# Patient Record
Sex: FEMALE | Race: WHITE | NOT HISPANIC OR LATINO | URBAN - METROPOLITAN AREA
[De-identification: names, ages, dates, MRNs, and addresses within clinical notes are randomized per-mention and may not be internally consistent; named-entity substitution may affect disease eponyms.]

---

## 2017-06-20 ENCOUNTER — EMERGENCY (EMERGENCY)
Facility: HOSPITAL | Age: 22
LOS: 0 days | Discharge: HOME | End: 2017-06-21
Attending: EMERGENCY MEDICINE | Admitting: EMERGENCY MEDICINE
Payer: COMMERCIAL

## 2017-06-20 VITALS
HEIGHT: 63 IN | RESPIRATION RATE: 18 BRPM | OXYGEN SATURATION: 100 % | HEART RATE: 100 BPM | TEMPERATURE: 99 F | SYSTOLIC BLOOD PRESSURE: 124 MMHG | DIASTOLIC BLOOD PRESSURE: 86 MMHG | WEIGHT: 179.9 LBS

## 2017-06-20 DIAGNOSIS — L55.1 SUNBURN OF SECOND DEGREE: ICD-10-CM

## 2017-06-20 PROCEDURE — 99283 EMERGENCY DEPT VISIT LOW MDM: CPT

## 2017-06-20 RX ORDER — ONDANSETRON 8 MG/1
4 TABLET, FILM COATED ORAL ONCE
Qty: 0 | Refills: 0 | Status: COMPLETED | OUTPATIENT
Start: 2017-06-20 | End: 2017-06-20
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
## 2017-06-21 VITALS
OXYGEN SATURATION: 100 % | DIASTOLIC BLOOD PRESSURE: 68 MMHG | RESPIRATION RATE: 18 BRPM | HEART RATE: 72 BPM | TEMPERATURE: 98 F | SYSTOLIC BLOOD PRESSURE: 133 MMHG

## 2017-06-21 RX ADMIN — ONDANSETRON 4 MILLIGRAM(S): 8 TABLET, FILM COATED ORAL at 00:32
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
## 2020-05-05 ENCOUNTER — APPOINTMENT (OUTPATIENT)
Dept: FAMILY MEDICINE | Facility: CLINIC | Age: 25
End: 2020-05-05
Payer: COMMERCIAL

## 2020-05-05 ENCOUNTER — LABORATORY RESULT (OUTPATIENT)
Age: 25
End: 2020-05-05

## 2020-05-05 VITALS
TEMPERATURE: 98 F | HEART RATE: 78 BPM | HEIGHT: 63 IN | OXYGEN SATURATION: 99 % | BODY MASS INDEX: 33.13 KG/M2 | SYSTOLIC BLOOD PRESSURE: 108 MMHG | WEIGHT: 187 LBS | DIASTOLIC BLOOD PRESSURE: 70 MMHG

## 2020-05-05 DIAGNOSIS — E55.9 VITAMIN D DEFICIENCY, UNSPECIFIED: ICD-10-CM

## 2020-05-05 DIAGNOSIS — Z83.2 FAMILY HISTORY OF DISEASES OF THE BLOOD AND BLOOD-FORMING ORGANS AND CERTAIN DISORDERS INVOLVING THE IMMUNE MECHANISM: ICD-10-CM

## 2020-05-05 DIAGNOSIS — Z84.0 FAMILY HISTORY OF DISEASES OF THE SKIN AND SUBCUTANEOUS TISSUE: ICD-10-CM

## 2020-05-05 DIAGNOSIS — E07.89 OTHER SPECIFIED DISORDERS OF THYROID: ICD-10-CM

## 2020-05-05 DIAGNOSIS — Z82.69 FAMILY HISTORY OF OTHER DISEASES OF THE MUSCULOSKELETAL SYSTEM AND CONNECTIVE TISSUE: ICD-10-CM

## 2020-05-05 DIAGNOSIS — Z80.3 FAMILY HISTORY OF MALIGNANT NEOPLASM OF BREAST: ICD-10-CM

## 2020-05-05 PROCEDURE — 99385 PREV VISIT NEW AGE 18-39: CPT | Mod: 25

## 2020-05-05 PROCEDURE — 36415 COLL VENOUS BLD VENIPUNCTURE: CPT

## 2020-05-06 NOTE — PHYSICAL EXAM
[No Acute Distress] : no acute distress [Well Nourished] : well nourished [Well Developed] : well developed [Well-Appearing] : well-appearing [Normal Sclera/Conjunctiva] : normal sclera/conjunctiva [PERRL] : pupils equal round and reactive to light [EOMI] : extraocular movements intact [Normal Outer Ear/Nose] : the outer ears and nose were normal in appearance [Normal Oropharynx] : the oropharynx was normal [No JVD] : no jugular venous distention [No Lymphadenopathy] : no lymphadenopathy [Supple] : supple [Thyroid Normal, No Nodules] : the thyroid was normal and there were no nodules present [No Respiratory Distress] : no respiratory distress  [No Accessory Muscle Use] : no accessory muscle use [Clear to Auscultation] : lungs were clear to auscultation bilaterally [Normal Rate] : normal rate  [Regular Rhythm] : with a regular rhythm [Normal S1, S2] : normal S1 and S2 [No Murmur] : no murmur heard [No Carotid Bruits] : no carotid bruits [No Abdominal Bruit] : a ~M bruit was not heard ~T in the abdomen [No Varicosities] : no varicosities [Pedal Pulses Present] : the pedal pulses are present [No Edema] : there was no peripheral edema [No Palpable Aorta] : no palpable aorta [No Extremity Clubbing/Cyanosis] : no extremity clubbing/cyanosis [Soft] : abdomen soft [Non Tender] : non-tender [Non-distended] : non-distended [No Masses] : no abdominal mass palpated [No HSM] : no HSM [Normal Bowel Sounds] : normal bowel sounds [Normal Posterior Cervical Nodes] : no posterior cervical lymphadenopathy [Normal Anterior Cervical Nodes] : no anterior cervical lymphadenopathy [No CVA Tenderness] : no CVA  tenderness [No Spinal Tenderness] : no spinal tenderness [No Joint Swelling] : no joint swelling [Grossly Normal Strength/Tone] : grossly normal strength/tone [No Rash] : no rash [Coordination Grossly Intact] : coordination grossly intact [No Focal Deficits] : no focal deficits [Normal Gait] : normal gait [Deep Tendon Reflexes (DTR)] : deep tendon reflexes were 2+ and symmetric [Normal Affect] : the affect was normal [Normal Insight/Judgement] : insight and judgment were intact [de-identified] : palpable thyroid [de-identified] : tender over left epicondyle

## 2020-05-06 NOTE — HISTORY OF PRESENT ILLNESS
[FreeTextEntry1] : NP, CPE, pt c/o left wrist pain on and off x 6 months  [de-identified] : 24 year old female to establish care\par last pap about 4 years ago\par menarche age 12\par history of irregular periods\par was regular for a time and now becoming irregular again only coming every three months\par \par also complains of left wrist wain, started from elbow down\par \par works as a  and a \par \par engaged to be \par \par walks for exercise\par gained about 15 pounds in the past year\par regular diet, restricts soda\par eats a lot of chocolate

## 2020-05-06 NOTE — HEALTH RISK ASSESSMENT
[Patient reported PAP Smear was normal] : Patient reported PAP Smear was normal [PapSmearDate] : 01/16 [PapSmearComments] : n

## 2020-05-15 ENCOUNTER — TRANSCRIPTION ENCOUNTER (OUTPATIENT)
Age: 25
End: 2020-05-15

## 2020-07-02 ENCOUNTER — APPOINTMENT (OUTPATIENT)
Dept: OBGYN | Facility: CLINIC | Age: 25
End: 2020-07-02
Payer: COMMERCIAL

## 2020-07-02 ENCOUNTER — LABORATORY RESULT (OUTPATIENT)
Age: 25
End: 2020-07-02

## 2020-07-02 VITALS
WEIGHT: 184 LBS | BODY MASS INDEX: 32.6 KG/M2 | DIASTOLIC BLOOD PRESSURE: 80 MMHG | SYSTOLIC BLOOD PRESSURE: 108 MMHG | HEIGHT: 63 IN

## 2020-07-02 DIAGNOSIS — Z13.1 ENCOUNTER FOR SCREENING FOR DIABETES MELLITUS: ICD-10-CM

## 2020-07-02 DIAGNOSIS — Z78.9 OTHER SPECIFIED HEALTH STATUS: ICD-10-CM

## 2020-07-02 DIAGNOSIS — R10.31 RIGHT LOWER QUADRANT PAIN: ICD-10-CM

## 2020-07-02 DIAGNOSIS — Z13.220 ENCOUNTER FOR SCREENING FOR LIPOID DISORDERS: ICD-10-CM

## 2020-07-02 DIAGNOSIS — Z13.89 ENCOUNTER FOR SCREENING FOR OTHER DISORDER: ICD-10-CM

## 2020-07-02 DIAGNOSIS — L68.0 HIRSUTISM: ICD-10-CM

## 2020-07-02 DIAGNOSIS — Z11.3 ENCOUNTER FOR SCREENING FOR INFECTIONS WITH A PREDOMINANTLY SEXUAL MODE OF TRANSMISSION: ICD-10-CM

## 2020-07-02 DIAGNOSIS — Z13.29 ENCOUNTER FOR SCREENING FOR OTHER SUSPECTED ENDOCRINE DISORDER: ICD-10-CM

## 2020-07-02 PROCEDURE — 36415 COLL VENOUS BLD VENIPUNCTURE: CPT

## 2020-07-02 PROCEDURE — 99214 OFFICE O/P EST MOD 30 MIN: CPT | Mod: 25

## 2020-07-02 PROCEDURE — 99385 PREV VISIT NEW AGE 18-39: CPT

## 2020-07-02 NOTE — HISTORY OF PRESENT ILLNESS
[___ Year(s) Ago] : [unfilled] year(s) ago [Healthy Diet] : a healthy diet [Good] : being in good health [Regular Exercise] : regular exercise [Last Pap ___] : Last cervical pap smear was [unfilled] [Reproductive Age] : is of reproductive age [Menstrual Problems] : reports abnormal menses [Menarche Age ____] : age at menarche was [unfilled] [Definite ___ (Date)] : the last menstrual period was [unfilled] [Irregular Cycle Intervals] : are  irregular [Regular Duration] : has been regular [Sexually Active] : is sexually active [Definite:  ___ (Date)] : the last menstrual period was [unfilled] [Contraception] : uses contraception [Menarche Age: ____] : age at menarche was [unfilled] [Condoms] : uses condoms [Yes] : yes [Burning] : no burning [Pregnancy History] : denies prior pregnancies [Stinging] : no stinging [Itching] : no itching [Mass] : no mass [Lesion] : no lesion [Soreness] : no soreness [Discharge] : no discharge [Localized Pain] : no localized pain [Mass (___cm)] : no palpable mass [Diffused Pain] : no diffused pain [Nipple Discharge] : no nipple discharge [Hot Flashes] : no hot flashes [Skin Color Change] : no skin color change [Night Sweats] : no night sweats

## 2020-07-02 NOTE — REVIEW OF SYSTEMS
[Dizziness] : dizziness [Headache] : headache [Nl] : Integumentary [Fever] : no fever [Chills] : no chills [Heart Rate Is Fast] : the heart rate was not fast [Chest Pain] : no chest pain [Dyspnea] : no shortness of breath [Palpitations] : no palpitations [Cough] : no cough [SOB on Exertion] : no shortness of breath during exertion [Abdominal Pain] : no abdominal pain [Vomiting] : no vomiting [Pelvic Pain] : pelvic pain [Dysuria] : no dysuria [Abn Vag Bleeding] : abnormal vaginal bleeding [Frequency] : no frequency

## 2020-07-02 NOTE — COUNSELING
[Breast Self Exam] : breast self exam [HIV Pretest] : HIV pretest [STD (testing, results, tx)] : STD (testing, results, tx) [Contraception] : contraception [Safe Sexual Practices] : safe sexual practices

## 2020-07-06 LAB
BASOPHILS # BLD AUTO: 0.04 K/UL
BASOPHILS NFR BLD AUTO: 0.5 %
C TRACH RRNA SPEC QL NAA+PROBE: NOT DETECTED
DHEA-S SERPL-MCNC: 384 UG/DL
EOSINOPHIL # BLD AUTO: 0.08 K/UL
EOSINOPHIL NFR BLD AUTO: 1.1 %
ESTRADIOL SERPL-MCNC: 49 PG/ML
FSH SERPL-MCNC: 6.2 IU/L
HAV IGM SER QL: NONREACTIVE
HBV CORE IGM SER QL: NONREACTIVE
HBV SURFACE AG SER QL: NONREACTIVE
HCG SERPL-MCNC: <1 MIU/ML
HCT VFR BLD CALC: 48 %
HCV AB SER QL: NONREACTIVE
HCV S/CO RATIO: 0.14 S/CO
HGB BLD-MCNC: 14.6 G/DL
HIV1+2 AB SPEC QL IA.RAPID: NONREACTIVE
HPV HIGH+LOW RISK DNA PNL CVX: NOT DETECTED
IMM GRANULOCYTES NFR BLD AUTO: 0.1 %
LH SERPL-ACNC: 10.5 IU/L
LYMPHOCYTES # BLD AUTO: 2.16 K/UL
LYMPHOCYTES NFR BLD AUTO: 29.5 %
MAN DIFF?: NORMAL
MCHC RBC-ENTMCNC: 30.4 GM/DL
MCHC RBC-ENTMCNC: 30.7 PG
MCV RBC AUTO: 101.1 FL
MONOCYTES # BLD AUTO: 0.67 K/UL
MONOCYTES NFR BLD AUTO: 9.2 %
N GONORRHOEA RRNA SPEC QL NAA+PROBE: NOT DETECTED
NEUTROPHILS # BLD AUTO: 4.36 K/UL
NEUTROPHILS NFR BLD AUTO: 59.6 %
PLATELET # BLD AUTO: 315 K/UL
PROLACTIN SERPL-MCNC: 16.5 NG/ML
RBC # BLD: 4.75 M/UL
RBC # FLD: 12.8 %
SOURCE TP AMPLIFICATION: NORMAL
T PALLIDUM AB SER QL IA: NEGATIVE
T3RU NFR SERPL: 1 TBI
TSH SERPL-ACNC: 1.42 UIU/ML
WBC # FLD AUTO: 7.32 K/UL

## 2020-07-08 LAB — CYTOLOGY CVX/VAG DOC THIN PREP: NORMAL

## 2020-07-13 ENCOUNTER — ASOB RESULT (OUTPATIENT)
Age: 25
End: 2020-07-13

## 2020-07-13 ENCOUNTER — APPOINTMENT (OUTPATIENT)
Dept: OBGYN | Facility: CLINIC | Age: 25
End: 2020-07-13
Payer: COMMERCIAL

## 2020-07-13 PROCEDURE — 76830 TRANSVAGINAL US NON-OB: CPT

## 2020-07-19 LAB
DHEA-SULFATE, SERUM: 323 UG/DL
T3REVERSE SERPL-MCNC: 17.8 NG/DL
TESTOST BND SERPL-MCNC: 8.7 PG/ML
TESTOST SERPL-MCNC: 59.9 NG/DL

## 2020-10-30 DIAGNOSIS — L70.9 ACNE, UNSPECIFIED: ICD-10-CM

## 2020-10-30 LAB
25(OH)D3 SERPL-MCNC: 18.5 NG/ML
ALBUMIN SERPL ELPH-MCNC: 5.2 G/DL
ALP BLD-CCNC: 67 U/L
ALT SERPL-CCNC: 15 U/L
ANION GAP SERPL CALC-SCNC: 13 MMOL/L
APPEARANCE: CLEAR
AST SERPL-CCNC: 18 U/L
BACTERIA: ABNORMAL
BASOPHILS # BLD AUTO: 0.03 K/UL
BASOPHILS NFR BLD AUTO: 0.5 %
BILIRUB SERPL-MCNC: 0.4 MG/DL
BILIRUBIN URINE: NEGATIVE
BLOOD URINE: NEGATIVE
BUN SERPL-MCNC: 12 MG/DL
CALCIUM SERPL-MCNC: 10 MG/DL
CHLORIDE SERPL-SCNC: 101 MMOL/L
CHOLEST SERPL-MCNC: 181 MG/DL
CHOLEST/HDLC SERPL: 3.8 RATIO
CO2 SERPL-SCNC: 23 MMOL/L
COLOR: YELLOW
CREAT SERPL-MCNC: 0.61 MG/DL
EOSINOPHIL # BLD AUTO: 0.1 K/UL
EOSINOPHIL NFR BLD AUTO: 1.7 %
ESTIMATED AVERAGE GLUCOSE: 108 MG/DL
ESTRADIOL SERPL-MCNC: 33 PG/ML
ESTROGEN SERPL-MCNC: 179 PG/ML
FSH SERPL-MCNC: 5.8 IU/L
GLUCOSE QUALITATIVE U: NEGATIVE
GLUCOSE SERPL-MCNC: 96 MG/DL
HBA1C MFR BLD HPLC: 5.4 %
HCT VFR BLD CALC: 41.7 %
HDLC SERPL-MCNC: 48 MG/DL
HGB BLD-MCNC: 13.7 G/DL
HIV1+2 AB SPEC QL IA.RAPID: NONREACTIVE
HYALINE CASTS: 0 /LPF
IMM GRANULOCYTES NFR BLD AUTO: 0.2 %
INSULIN SERPL-MCNC: 16.8 UU/ML
KETONES URINE: NEGATIVE
LDLC SERPL CALC-MCNC: 117 MG/DL
LEUKOCYTE ESTERASE URINE: NEGATIVE
LH SERPL-ACNC: 14 IU/L
LYMPHOCYTES # BLD AUTO: 1.92 K/UL
LYMPHOCYTES NFR BLD AUTO: 32.4 %
MAN DIFF?: NORMAL
MCHC RBC-ENTMCNC: 31.2 PG
MCHC RBC-ENTMCNC: 32.9 GM/DL
MCV RBC AUTO: 95 FL
MICROSCOPIC-UA: NORMAL
MONOCYTES # BLD AUTO: 0.55 K/UL
MONOCYTES NFR BLD AUTO: 9.3 %
NEUTROPHILS # BLD AUTO: 3.31 K/UL
NEUTROPHILS NFR BLD AUTO: 55.9 %
NITRITE URINE: NEGATIVE
PH URINE: 6.5
PLATELET # BLD AUTO: 252 K/UL
POTASSIUM SERPL-SCNC: 4.6 MMOL/L
PROGEST SERPL-MCNC: 0.3 NG/ML
PROLACTIN SERPL-MCNC: 18.4 NG/ML
PROT SERPL-MCNC: 8 G/DL
PROTEIN URINE: NORMAL
RBC # BLD: 4.39 M/UL
RBC # FLD: 12.5 %
RED BLOOD CELLS URINE: 1 /HPF
SODIUM SERPL-SCNC: 137 MMOL/L
SPECIFIC GRAVITY URINE: 1.03
SQUAMOUS EPITHELIAL CELLS: 8 /HPF
T3 SERPL-MCNC: 109 NG/DL
T3FREE SERPL-MCNC: 3.3 PG/ML
T3RU NFR SERPL: 1 TBI
T4 FREE SERPL-MCNC: 1.1 NG/DL
T4 SERPL-MCNC: 6.2 UG/DL
TESTOST SERPL-MCNC: 45.6 NG/DL
THYROGLOB AB SERPL-ACNC: 20.6 IU/ML
THYROPEROXIDASE AB SERPL IA-ACNC: <10 IU/ML
TRIGL SERPL-MCNC: 78 MG/DL
TSH SERPL-ACNC: 2.27 UIU/ML
URATE SERPL-MCNC: 6.9 MG/DL
UROBILINOGEN URINE: NORMAL
WBC # FLD AUTO: 5.92 K/UL
WHITE BLOOD CELLS URINE: 2 /HPF

## 2021-02-26 ENCOUNTER — NON-APPOINTMENT (OUTPATIENT)
Age: 26
End: 2021-02-26

## 2021-07-06 ENCOUNTER — APPOINTMENT (OUTPATIENT)
Dept: OBGYN | Facility: CLINIC | Age: 26
End: 2021-07-06
Payer: COMMERCIAL

## 2021-07-06 VITALS
BODY MASS INDEX: 34.2 KG/M2 | SYSTOLIC BLOOD PRESSURE: 124 MMHG | WEIGHT: 193 LBS | DIASTOLIC BLOOD PRESSURE: 72 MMHG | HEIGHT: 63 IN | TEMPERATURE: 97.5 F

## 2021-07-06 DIAGNOSIS — E66.9 OBESITY, UNSPECIFIED: ICD-10-CM

## 2021-07-06 DIAGNOSIS — Z11.51 ENCOUNTER FOR SCREENING FOR HUMAN PAPILLOMAVIRUS (HPV): ICD-10-CM

## 2021-07-06 DIAGNOSIS — N92.1 EXCESSIVE AND FREQUENT MENSTRUATION WITH IRREGULAR CYCLE: ICD-10-CM

## 2021-07-06 DIAGNOSIS — Z31.69 ENCOUNTER FOR OTHER GENERAL COUNSELING AND ADVICE ON PROCREATION: ICD-10-CM

## 2021-07-06 DIAGNOSIS — Z87.42 PERSONAL HISTORY OF OTHER DISEASES OF THE FEMALE GENITAL TRACT: ICD-10-CM

## 2021-07-06 DIAGNOSIS — Z12.4 ENCOUNTER FOR SCREENING FOR MALIGNANT NEOPLASM OF CERVIX: ICD-10-CM

## 2021-07-06 DIAGNOSIS — Z01.411 ENCOUNTER FOR GYNECOLOGICAL EXAMINATION (GENERAL) (ROUTINE) WITH ABNORMAL FINDINGS: ICD-10-CM

## 2021-07-06 DIAGNOSIS — Z30.41 ENCOUNTER FOR SURVEILLANCE OF CONTRACEPTIVE PILLS: ICD-10-CM

## 2021-07-06 PROCEDURE — 99395 PREV VISIT EST AGE 18-39: CPT

## 2021-07-06 PROCEDURE — 99072 ADDL SUPL MATRL&STAF TM PHE: CPT

## 2021-07-06 NOTE — COUNSELING
[Nutrition/ Exercise/ Weight Management] : nutrition, exercise, weight management [Vitamins/Supplements] : vitamins/supplements [Breast Self Exam] : breast self exam [Contraception/ Emergency Contraception/ Safe Sexual Practices] : contraception, emergency contraception, safe sexual practices [Sunscreen] : sunscreen

## 2021-07-06 NOTE — HISTORY OF PRESENT ILLNESS
[Oral Contraceptive] : uses oral contraception pills [Y] : Patient is sexually active [N] : Patient denies prior pregnancies [Menarche Age: ____] : age at menarche was [unfilled] [Currently Active] : currently active [Men] : men [PapSmeardate] : 07/02/2020 [TextBox_31] : neg [LMPDate] : 06/27/2021 [FreeTextEntry1] : 06/27/2021

## 2021-07-06 NOTE — DISCUSSION/SUMMARY
[FreeTextEntry1] : The R/B/C of OBC's were reviewed.  Pt encouraged to initiate a diet and exercise regimen to decrease weight, health risk reviewed.  Reviewed low Vit D level, need for supplements.  Pt stating she will initiate VIt D3, 2000 IU daily. PT will also start multivitamin/folic acid  - in preparation for starting family, next year.  Pt to RTO annual yearly. Discussed when ready to try conceiving, to stop OBC's 3 months prior, ovulation reviewed, pt starting multivitamin/folic acid.   Pt    All the pt's questions and concerns were addressed.

## 2021-07-24 LAB — CYTOLOGY CVX/VAG DOC THIN PREP: NORMAL

## 2021-08-03 ENCOUNTER — APPOINTMENT (OUTPATIENT)
Dept: ORTHOPEDIC SURGERY | Facility: CLINIC | Age: 26
End: 2021-08-03
Payer: COMMERCIAL

## 2021-08-03 ENCOUNTER — NON-APPOINTMENT (OUTPATIENT)
Age: 26
End: 2021-08-03

## 2021-08-03 VITALS
HEART RATE: 67 BPM | WEIGHT: 193 LBS | BODY MASS INDEX: 34.2 KG/M2 | DIASTOLIC BLOOD PRESSURE: 73 MMHG | HEIGHT: 63 IN | SYSTOLIC BLOOD PRESSURE: 110 MMHG

## 2021-08-03 DIAGNOSIS — M77.12 LATERAL EPICONDYLITIS, LEFT ELBOW: ICD-10-CM

## 2021-08-03 PROCEDURE — 99204 OFFICE O/P NEW MOD 45 MIN: CPT

## 2021-08-03 PROCEDURE — 99072 ADDL SUPL MATRL&STAF TM PHE: CPT

## 2021-08-03 NOTE — HISTORY OF PRESENT ILLNESS
[FreeTextEntry1] : 25 year female presents for evaluation of elbow pain, present for about one year. She reports her symptoms were mild and intermittent at onset, but reports worsening of her pain over the last couple months. She now reports constant dull achy pain localized to the lateral aspect of the left elbow, with radiation into the extensor aspect of the forearm. She reports she has sharp severe pain with activity. She also reports mild intermittent numbness/tingling when leaning on the left elbow for long period of time, that occurs on about a monthly basis. She has been using a counterforce brace with minimal relief.

## 2021-08-03 NOTE — PHYSICAL EXAM
[Normal] : Oriented to person, place, and time, insight and judgement were intact and the affect was normal [de-identified] : The elbow has a range of motion from 0° to 140° of flexion bilaterally. There is 80° of pronation and 80° of supination bilaterally. There is no palpable effusion bilaterally. Test for instability are negative including posterolateral rotatory instability and valgus instability bilaterally. There is no tenderness along the posterior lateral or posterior medial joint line. No tenderness of the biceps and triceps which are intact with 5 over 5 strength bilaterally. No pain upon forced flexion or extension of the elbows bilaterally. There is tenderness to palpation over the left lateral epicondyles and specifically over the ECRB origin which is negative on the opposite side. There is pain with resisted wrist extension as well as resisted long finger extension which localizes to the lateral epicondyle.  No evidence of intrinsic or extrinsic atrophy bilaterally.There is no sensitivity of the radial nerve within the radial tunnel with provocative movements. No sensitivity over the median and ulnar nerves bilaterally.\par

## 2021-08-03 NOTE — ASSESSMENT
[FreeTextEntry1] : 25-year-old female with left lateral pain consistent with mild tennis elbow. I recommend conservative treatment with a course of physical therapy and anti-inflammatory medication. The patient is in agreement with this plan and will follow up in 4 weeks for reevaluation.

## 2021-08-16 NOTE — ED ADULT NURSE NOTE - TEMPLATE
Medications have been prescribed to you today please pick them up at your pharmacy they have been sent electronically.  If your pharmacy advises you that any of these prescriptions require prior authorization please allow us approximately 10 business days to complete the prior authorization process and hear back from your insurance company.    If you require refills on medications prior to your next scheduled appointment please allow 48 to 72 hours for completion of this request and please login to the live well application through which we can communicate to you that the medications have been sent to the pharmacy.    If we have ordered diagnostic studies such as CT scans, MRIs, ultrasounds, mammograms, they often require prior authorization by your insurance company.  In order to facilitate us obtaining this authorization for you it is imperative for you to schedule the appointment and notify us of the time and date of the appointment so that we can complete the authorization process for you.  Should you fail to notify us of the time and date of your appointment cannot guarantee that this procedure will be properly authorized by your insurance company.    
Burns

## 2021-10-01 ENCOUNTER — NON-APPOINTMENT (OUTPATIENT)
Age: 26
End: 2021-10-01

## 2021-10-01 ENCOUNTER — APPOINTMENT (OUTPATIENT)
Dept: FAMILY MEDICINE | Facility: CLINIC | Age: 26
End: 2021-10-01
Payer: SELF-PAY

## 2021-10-01 VITALS
WEIGHT: 188 LBS | HEART RATE: 74 BPM | DIASTOLIC BLOOD PRESSURE: 82 MMHG | TEMPERATURE: 97.2 F | HEIGHT: 63 IN | OXYGEN SATURATION: 97 % | BODY MASS INDEX: 33.31 KG/M2 | SYSTOLIC BLOOD PRESSURE: 110 MMHG

## 2021-10-01 DIAGNOSIS — R52 PAIN, UNSPECIFIED: ICD-10-CM

## 2021-10-01 DIAGNOSIS — Z20.822 CONTACT WITH AND (SUSPECTED) EXPOSURE TO COVID-19: ICD-10-CM

## 2021-10-01 DIAGNOSIS — Z71.89 OTHER SPECIFIED COUNSELING: ICD-10-CM

## 2021-10-01 DIAGNOSIS — R50.9 FEVER, UNSPECIFIED: ICD-10-CM

## 2021-10-01 PROCEDURE — 99215 OFFICE O/P EST HI 40 MIN: CPT | Mod: 25

## 2021-10-01 PROCEDURE — 93000 ELECTROCARDIOGRAM COMPLETE: CPT

## 2021-10-01 RX ORDER — MELOXICAM 15 MG/1
15 TABLET ORAL
Qty: 14 | Refills: 2 | Status: COMPLETED | COMMUNITY
Start: 2021-08-03 | End: 2021-10-01

## 2021-10-01 NOTE — PLAN
[FreeTextEntry1] : EKG performed: SR at 84 bpm , normal axis, non-specific ST/T changes \par \par close monitoring!!!!!! aggressive hydration!!!!! see med orders.  cont OTC Tylenol 500-1000mg q6-8h prn for fever and body aches  \par self quarantine x 10d \par \par instructed pt to pay careful attention at Day 5-8 (currently on Day 3) given risk of decompensation \par \par await COVID-19 PCR results \par \par Start OTC B12 1000mcg qd \par Start OTC Vit D 5KU daily \par Start OTC Vit C 1000mg qd \par Start Zinc 40mg qd\par Start  Mg++ 200 mg qd \par

## 2021-10-01 NOTE — HISTORY OF PRESENT ILLNESS
[FreeTextEntry1] : Pt. c/o cough/SOB  x 4 days \par Pt. went to [facility]  [de-identified] : 26 yo female s/p [facility]'s ED evaluation on Wed 9/29/21 secondary to syncope.    +fever 1 day prior to syncopal episode 100.5 \par +ve body aches, +ve chills, +ve dizziness \par pt states was talking c mother post breakfast  and suffered a syncopal episode  \par Mother states patient was not arousable for "7 mins" as per mother \par EMS contacted and brought to [facility] ED  for STAT evaluation.   EKG performed, PO fluids given,  no IVF hydration, OTC Motrin given as per pt was d/c'ed \par Persistent fever  Glae=223.8F\par Fever= 99.8F today, +ve chills, +ve body aches \par +ve good appetite\par intact sense of smell/taste\par \par no known COVID-19 contact \par \par Occ Hx:   at a  Car dealership,

## 2021-10-01 NOTE — REVIEW OF SYSTEMS
[Negative] : Heme/Lymph [FreeTextEntry2] : see HPI  [FreeTextEntry4] : see HPI [FreeTextEntry6] : see HPI  [FreeTextEntry7] : see HPI  [FreeTextEntry9] : see HPI

## 2021-10-01 NOTE — PHYSICAL EXAM
[No Acute Distress] : no acute distress [EOMI] : extraocular movements intact [Normal Outer Ear/Nose] : the outer ears and nose were normal in appearance [No Respiratory Distress] : no respiratory distress  [No Accessory Muscle Use] : no accessory muscle use [Non-distended] : non-distended [No CVA Tenderness] : no CVA  tenderness [Grossly Normal Strength/Tone] : grossly normal strength/tone [No Rash] : no rash [Coordination Grossly Intact] : coordination grossly intact [No Focal Deficits] : no focal deficits [Normal Gait] : normal gait [Speech Grossly Normal] : speech grossly normal [Normal Affect] : the affect was normal [Normal Mood] : the mood was normal [Normal Insight/Judgement] : insight and judgment were intact

## 2021-10-04 ENCOUNTER — NON-APPOINTMENT (OUTPATIENT)
Age: 26
End: 2021-10-04

## 2021-10-04 LAB — SARS-COV-2 N GENE NPH QL NAA+PROBE: NOT DETECTED

## 2022-02-09 NOTE — PHYSICAL EXAM
Procedure(s):  PHACO WITH IOL OD.     MAC    Anesthesia Post Evaluation      Multimodal analgesia: multimodal analgesia not used between 6 hours prior to anesthesia start to PACU discharge  Patient location during evaluation: bedside  Patient participation: complete - patient participated  Level of consciousness: awake and alert  Pain management: adequate  Airway patency: patent  Anesthetic complications: no  Cardiovascular status: acceptable and stable  Respiratory status: acceptable, spontaneous ventilation and room air  Hydration status: acceptable  Post anesthesia nausea and vomiting:  none  Final Post Anesthesia Temperature Assessment:  Normothermia (36.0-37.5 degrees C)      INITIAL Post-op Vital signs:   Vitals Value Taken Time   /80 02/09/22 1018   Temp 36.7 °C (98 °F) 02/09/22 1018   Pulse 76 02/09/22 1018   Resp 16 02/09/22 1018   SpO2 98 % 02/09/22 1018 [Awake] : awake [Alert] : alert [Soft] : soft [Oriented x3] : oriented to person, place, and time [Labia Majora] : labia major [Normal] : uterus [No Bleeding] : there was no active vaginal bleeding [Labia Minora] : labia minora [Pap Obtained] : a Pap smear was performed [Acute Distress] : no acute distress [Mass] : no breast mass [Nipple Discharge] : no nipple discharge [Axillary LAD] : no axillary lymphadenopathy [Tender] : non tender [Distended] : not distended [Depressed Mood] : not depressed [Flat Affect] : affect not flat [Tenderness] : nontender [Adnexa Tenderness] : were not tender

## 2022-08-20 ENCOUNTER — NON-APPOINTMENT (OUTPATIENT)
Age: 27
End: 2022-08-20

## 2022-11-01 ENCOUNTER — APPOINTMENT (OUTPATIENT)
Dept: OBGYN | Facility: CLINIC | Age: 27
End: 2022-11-01

## 2022-11-01 VITALS
SYSTOLIC BLOOD PRESSURE: 125 MMHG | DIASTOLIC BLOOD PRESSURE: 85 MMHG | BODY MASS INDEX: 35.79 KG/M2 | HEIGHT: 63 IN | OXYGEN SATURATION: 94 % | RESPIRATION RATE: 13 BRPM | TEMPERATURE: 98.2 F | WEIGHT: 202 LBS | HEART RATE: 94 BPM

## 2022-11-01 PROCEDURE — 81003 URINALYSIS AUTO W/O SCOPE: CPT | Mod: QW

## 2022-11-01 PROCEDURE — 76817 TRANSVAGINAL US OBSTETRIC: CPT

## 2022-11-01 PROCEDURE — 99214 OFFICE O/P EST MOD 30 MIN: CPT | Mod: 25

## 2022-11-01 NOTE — HISTORY OF PRESENT ILLNESS
[FreeTextEntry1] : 26yo  presents with positive pregnancy test.\par LMP: unknown, IVF pregnancy\par \par Ucg: positive\par U Dip: neg/neg\par Office US: CRL 1.82cm, 8w2d + FCA\par \par PMH: none\par PSH: tonsillectomy\par Meds: PNV, progesterone\par All: NKDA\par SH: neg x 3\par \par Plan\par - will obtain exact IVF due date, pt will discontinue progesterone once cleared by VIJAYA\par - Records received include: neg universal carrier screen, O+/Ab - , normal hemoglobin electrophoresis, normal CBC, Measle and Rubella immune, Varicella Non-immune, A1c 5.3%, Neg HIV/Hep B&C/RPR/GC/CT.\par - Reviewed PNV, diet & exercise, course of pregnancy, all questions answered.\par 45min spent excluding sono\par RTO 4 weeks for new OB, MFM consults placed.\par R MD Yanick\par \par

## 2022-11-09 ENCOUNTER — NON-APPOINTMENT (OUTPATIENT)
Age: 27
End: 2022-11-09

## 2022-11-09 DIAGNOSIS — Z34.90 ENCOUNTER FOR SUPERVISION OF NORMAL PREGNANCY, UNSPECIFIED, UNSPECIFIED TRIMESTER: ICD-10-CM

## 2022-12-01 ENCOUNTER — APPOINTMENT (OUTPATIENT)
Dept: OBGYN | Facility: CLINIC | Age: 27
End: 2022-12-01

## 2022-12-01 ENCOUNTER — NON-APPOINTMENT (OUTPATIENT)
Age: 27
End: 2022-12-01

## 2022-12-01 VITALS
DIASTOLIC BLOOD PRESSURE: 72 MMHG | WEIGHT: 206 LBS | SYSTOLIC BLOOD PRESSURE: 114 MMHG | HEIGHT: 63 IN | BODY MASS INDEX: 36.5 KG/M2 | RESPIRATION RATE: 14 BRPM

## 2022-12-01 DIAGNOSIS — Z34.90 ENCOUNTER FOR SUPERVISION OF NORMAL PREGNANCY, UNSPECIFIED, UNSPECIFIED TRIMESTER: ICD-10-CM

## 2022-12-01 PROCEDURE — 36415 COLL VENOUS BLD VENIPUNCTURE: CPT

## 2022-12-01 PROCEDURE — 0501F PRENATAL FLOW SHEET: CPT

## 2022-12-01 PROCEDURE — 81003 URINALYSIS AUTO W/O SCOPE: CPT | Mod: QW

## 2022-12-05 LAB
ABO + RH PNL BLD: NORMAL
BASOPHILS # BLD AUTO: 0.02 K/UL
BASOPHILS NFR BLD AUTO: 0.2 %
EOSINOPHIL # BLD AUTO: 0.12 K/UL
EOSINOPHIL NFR BLD AUTO: 1.4 %
HCT VFR BLD CALC: 39.9 %
HGB BLD-MCNC: 13 G/DL
IMM GRANULOCYTES NFR BLD AUTO: 0.7 %
LYMPHOCYTES # BLD AUTO: 2.27 K/UL
LYMPHOCYTES NFR BLD AUTO: 25.8 %
MAN DIFF?: NORMAL
MCHC RBC-ENTMCNC: 31 PG
MCHC RBC-ENTMCNC: 32.6 GM/DL
MCV RBC AUTO: 95 FL
MONOCYTES # BLD AUTO: 0.64 K/UL
MONOCYTES NFR BLD AUTO: 7.3 %
NEUTROPHILS # BLD AUTO: 5.7 K/UL
NEUTROPHILS NFR BLD AUTO: 64.6 %
PLATELET # BLD AUTO: 232 K/UL
RBC # BLD: 4.2 M/UL
RBC # FLD: 12.8 %
WBC # FLD AUTO: 8.81 K/UL

## 2022-12-06 ENCOUNTER — ASOB RESULT (OUTPATIENT)
Age: 27
End: 2022-12-06

## 2022-12-06 ENCOUNTER — APPOINTMENT (OUTPATIENT)
Dept: MATERNAL FETAL MEDICINE | Facility: CLINIC | Age: 27
End: 2022-12-06

## 2022-12-06 PROCEDURE — 99202 OFFICE O/P NEW SF 15 MIN: CPT | Mod: 95

## 2022-12-09 ENCOUNTER — APPOINTMENT (OUTPATIENT)
Dept: ANTEPARTUM | Facility: CLINIC | Age: 27
End: 2022-12-09
Payer: COMMERCIAL

## 2022-12-09 ENCOUNTER — ASOB RESULT (OUTPATIENT)
Age: 27
End: 2022-12-09

## 2022-12-09 DIAGNOSIS — O35.1XX0 MATERNAL CARE FOR (SUSPECTED) CHROMOSOMAL ABNORMALITY IN FETUS, NOT APPLICABLE OR UNSPECIFIED: ICD-10-CM

## 2022-12-09 PROCEDURE — 36415 COLL VENOUS BLD VENIPUNCTURE: CPT

## 2022-12-09 PROCEDURE — 76813 OB US NUCHAL MEAS 1 GEST: CPT

## 2022-12-14 ENCOUNTER — NON-APPOINTMENT (OUTPATIENT)
Age: 27
End: 2022-12-14

## 2022-12-15 LAB
ADDITIONAL US: NORMAL
COMMENTS: AFP: NORMAL
CRL SCAN TWIN B: NORMAL
CRL SCAN: NORMAL
CROWN RUMP LENGTH TWIN B: NORMAL
CROWN RUMP LENGTH: 80.7 MM
DOWN SYNDROME AGE RISK: NORMAL
DOWN SYNDROME INTERPRETATION: NORMAL
DOWN SYNDROME SCREENING RISK: NORMAL
GEST. AGE ON COLLECTION DATE: 13.7 WEEKS
HCG MOM: 1.21
HCG VALUE: 84.4 IU/ML
MATERNAL AGE AT EDD: 26.5 YR
NOTE: AFP: NORMAL
NT MOM TWIN B: NORMAL
NT TWIN B: NORMAL
NUCHAL TRANSLUCENCY (NT): 1.6 MM
NUCHAL TRANSLUCENCY MOM: 1.12
NUMBER OF FETUSES: 1
PAPP-A MOM: 1.5
PAPP-A VALUE: 1476.7 NG/ML
RACE: NORMAL
RESULTS AFP: NORMAL
SONOGRAPHER ID#: NORMAL
SUBMIT PART 2 SAMPLE USING: NORMAL
TEST RESULTS: AFP: NORMAL
TRISOMY 18 AGE RISK: NORMAL
TRISOMY 18 INTERPRETATION: NORMAL
TRISOMY 18 SCREENING RISK: NORMAL
WEIGHT AFP: 207 LBS

## 2022-12-16 ENCOUNTER — NON-APPOINTMENT (OUTPATIENT)
Age: 27
End: 2022-12-16

## 2022-12-21 ENCOUNTER — NON-APPOINTMENT (OUTPATIENT)
Age: 27
End: 2022-12-21

## 2022-12-28 ENCOUNTER — APPOINTMENT (OUTPATIENT)
Dept: OBGYN | Facility: CLINIC | Age: 27
End: 2022-12-28

## 2022-12-28 VITALS
BODY MASS INDEX: 37.03 KG/M2 | SYSTOLIC BLOOD PRESSURE: 109 MMHG | HEIGHT: 63 IN | RESPIRATION RATE: 12 BRPM | DIASTOLIC BLOOD PRESSURE: 72 MMHG | HEART RATE: 88 BPM | WEIGHT: 209 LBS | TEMPERATURE: 98.4 F | OXYGEN SATURATION: 100 %

## 2022-12-28 DIAGNOSIS — Z34.92 ENCOUNTER FOR SUPERVISION OF NORMAL PREGNANCY, UNSPECIFIED, SECOND TRIMESTER: ICD-10-CM

## 2022-12-28 PROCEDURE — 81003 URINALYSIS AUTO W/O SCOPE: CPT | Mod: QW

## 2022-12-28 PROCEDURE — 76817 TRANSVAGINAL US OBSTETRIC: CPT

## 2022-12-28 PROCEDURE — 0502F SUBSEQUENT PRENATAL CARE: CPT

## 2022-12-30 LAB
BILIRUB UR QL STRIP: NORMAL
CLARITY UR: CLEAR
GLUCOSE 1H P 100 G GLC PO SERPL-MCNC: 104 MG/DL
GLUCOSE UR-MCNC: NORMAL
HCG UR QL: 0.2 EU/DL
HGB UR QL STRIP.AUTO: NORMAL
KETONES UR-MCNC: NORMAL
LEUKOCYTE ESTERASE UR QL STRIP: NORMAL
NITRITE UR QL STRIP: NORMAL
PH UR STRIP: 6.5
PROT UR STRIP-MCNC: NORMAL
SP GR UR STRIP: 1.03

## 2023-01-03 ENCOUNTER — NON-APPOINTMENT (OUTPATIENT)
Age: 28
End: 2023-01-03

## 2023-01-04 LAB
ADDITIONAL US: NORMAL
AFP MOM: 1.26
AFP VALUE: 34.1 NG/ML
COLLECTED ON 2: NORMAL
COLLECTED ON: NORMAL
CRL SCAN TWIN B: NORMAL
CRL SCAN: NORMAL
CROWN RUMP LENGTH TWIN B: NORMAL
CROWN RUMP LENGTH: 80.7 MM
DIA MOM: 1.54
DIA VALUE: 198 PG/ML
DOWN SYNDROME AGE RISK: NORMAL
DOWN SYNDROME INTERPRETATION: NORMAL
DOWN SYNDROME SCREENING RISK: NORMAL
FIRST TRIMESTER SAMPLE: NORMAL
GEST. AGE ON COLLECTION DATE: 13.7 WEEKS
GESTATIONAL AGE: 16.4 WEEKS
HCG MOM: 1.36
HCG VALUE: 38.1 IU/ML
INSULIN DEP DIABETES: NO
MATERNAL AGE AT EDD: 26.4 YR
NT MOM TWIN B: NORMAL
NT TWIN B: NORMAL
NUCHAL TRANSLUCENCY (NT): 1.6 MM
NUCHAL TRANSLUCENCY MOM: 1.12
NUMBER OF FETUSES: 1
OPEN SPINA BIFIDA: NORMAL
OSB INTERPRETATION: NORMAL
PAPP-A MOM: 1.5
PAPP-A VALUE: 1476.7 NG/ML
RACE: NORMAL
SECOND TRIMESTER SAMPLE: NORMAL
SEQUENTIAL 2 COMMENTS: NORMAL
SEQUENTIAL 2 NOTE: NORMAL
SEQUENTIAL 2 RESULTS: NORMAL
SEQUENTIAL 2 TEST RESULTS: NORMAL
SONOGRAPHER ID#: NORMAL
TRISOMY 18 AGE RISK: NORMAL
TRISOMY 18 INTERPRETATION: NORMAL
TRISOMY 18 SCREENING RISK: NORMAL
UE3 MOM: 1.47
UE3 VALUE: 1.39 NG/ML
WEIGHT AFP: 207 LBS
WEIGHT: 210 LBS

## 2023-01-26 ENCOUNTER — ASOB RESULT (OUTPATIENT)
Age: 28
End: 2023-01-26

## 2023-01-26 ENCOUNTER — APPOINTMENT (OUTPATIENT)
Dept: ANTEPARTUM | Facility: CLINIC | Age: 28
End: 2023-01-26
Payer: COMMERCIAL

## 2023-01-26 PROCEDURE — 76817 TRANSVAGINAL US OBSTETRIC: CPT

## 2023-01-26 PROCEDURE — 76811 OB US DETAILED SNGL FETUS: CPT

## 2023-01-27 ENCOUNTER — NON-APPOINTMENT (OUTPATIENT)
Age: 28
End: 2023-01-27

## 2023-01-30 ENCOUNTER — RESULT CHARGE (OUTPATIENT)
Age: 28
End: 2023-01-30

## 2023-01-30 ENCOUNTER — APPOINTMENT (OUTPATIENT)
Dept: OBGYN | Facility: CLINIC | Age: 28
End: 2023-01-30
Payer: COMMERCIAL

## 2023-01-30 VITALS
DIASTOLIC BLOOD PRESSURE: 70 MMHG | HEIGHT: 63 IN | WEIGHT: 211 LBS | BODY MASS INDEX: 37.39 KG/M2 | SYSTOLIC BLOOD PRESSURE: 108 MMHG

## 2023-01-30 LAB
BILIRUB UR QL STRIP: NORMAL
CLARITY UR: CLEAR
COLLECTION METHOD: NORMAL
GLUCOSE UR-MCNC: 100
HCG UR QL: 0.2 EU/DL
HGB UR QL STRIP.AUTO: NORMAL
KETONES UR-MCNC: NORMAL
LEUKOCYTE ESTERASE UR QL STRIP: NORMAL
NITRITE UR QL STRIP: NORMAL
PH UR STRIP: 7
PROT UR STRIP-MCNC: NORMAL
SP GR UR STRIP: 1.02

## 2023-01-30 PROCEDURE — 0502F SUBSEQUENT PRENATAL CARE: CPT

## 2023-01-31 ENCOUNTER — APPOINTMENT (OUTPATIENT)
Dept: OBGYN | Facility: CLINIC | Age: 28
End: 2023-01-31

## 2023-02-05 ENCOUNTER — NON-APPOINTMENT (OUTPATIENT)
Age: 28
End: 2023-02-05

## 2023-02-07 ENCOUNTER — NON-APPOINTMENT (OUTPATIENT)
Age: 28
End: 2023-02-07

## 2023-02-14 ENCOUNTER — APPOINTMENT (OUTPATIENT)
Dept: ANTEPARTUM | Facility: CLINIC | Age: 28
End: 2023-02-14
Payer: COMMERCIAL

## 2023-02-14 ENCOUNTER — ASOB RESULT (OUTPATIENT)
Age: 28
End: 2023-02-14

## 2023-02-14 PROCEDURE — 76816 OB US FOLLOW-UP PER FETUS: CPT

## 2023-02-24 ENCOUNTER — APPOINTMENT (OUTPATIENT)
Dept: ANTEPARTUM | Facility: CLINIC | Age: 28
End: 2023-02-24

## 2023-02-27 ENCOUNTER — NON-APPOINTMENT (OUTPATIENT)
Age: 28
End: 2023-02-27

## 2023-02-28 ENCOUNTER — APPOINTMENT (OUTPATIENT)
Dept: OBGYN | Facility: CLINIC | Age: 28
End: 2023-02-28
Payer: COMMERCIAL

## 2023-02-28 VITALS
SYSTOLIC BLOOD PRESSURE: 98 MMHG | WEIGHT: 215 LBS | BODY MASS INDEX: 38.09 KG/M2 | DIASTOLIC BLOOD PRESSURE: 60 MMHG | HEIGHT: 63 IN

## 2023-02-28 PROCEDURE — 0502F SUBSEQUENT PRENATAL CARE: CPT

## 2023-03-13 ENCOUNTER — NON-APPOINTMENT (OUTPATIENT)
Age: 28
End: 2023-03-13

## 2023-03-14 ENCOUNTER — MED ADMIN CHARGE (OUTPATIENT)
Age: 28
End: 2023-03-14

## 2023-03-14 ENCOUNTER — APPOINTMENT (OUTPATIENT)
Dept: OBGYN | Facility: CLINIC | Age: 28
End: 2023-03-14
Payer: COMMERCIAL

## 2023-03-14 VITALS
DIASTOLIC BLOOD PRESSURE: 60 MMHG | BODY MASS INDEX: 38.8 KG/M2 | HEIGHT: 63 IN | WEIGHT: 219 LBS | SYSTOLIC BLOOD PRESSURE: 100 MMHG

## 2023-03-14 LAB
BILIRUB UR QL STRIP: NORMAL
GLUCOSE UR-MCNC: NORMAL
HCG UR QL: 0.2 EU/DL
HGB UR QL STRIP.AUTO: NORMAL
KETONES UR-MCNC: NORMAL
LEUKOCYTE ESTERASE UR QL STRIP: NORMAL
NITRITE UR QL STRIP: NORMAL
PH UR STRIP: 7
PROT UR STRIP-MCNC: NORMAL
SP GR UR STRIP: 1.01

## 2023-03-14 PROCEDURE — 90715 TDAP VACCINE 7 YRS/> IM: CPT

## 2023-03-14 PROCEDURE — 36415 COLL VENOUS BLD VENIPUNCTURE: CPT

## 2023-03-14 PROCEDURE — 90471 IMMUNIZATION ADMIN: CPT

## 2023-03-14 PROCEDURE — 81003 URINALYSIS AUTO W/O SCOPE: CPT | Mod: QW

## 2023-03-14 PROCEDURE — 0502F SUBSEQUENT PRENATAL CARE: CPT

## 2023-03-21 ENCOUNTER — ASOB RESULT (OUTPATIENT)
Age: 28
End: 2023-03-21

## 2023-03-21 ENCOUNTER — APPOINTMENT (OUTPATIENT)
Dept: ANTEPARTUM | Facility: CLINIC | Age: 28
End: 2023-03-21
Payer: COMMERCIAL

## 2023-03-21 PROCEDURE — 76816 OB US FOLLOW-UP PER FETUS: CPT

## 2023-03-25 ENCOUNTER — TRANSCRIPTION ENCOUNTER (OUTPATIENT)
Age: 28
End: 2023-03-25

## 2023-03-25 LAB
BASOPHILS # BLD AUTO: 0.03 K/UL
BASOPHILS NFR BLD AUTO: 0.3 %
BILIRUB UR QL STRIP: NORMAL
EOSINOPHIL # BLD AUTO: 0.11 K/UL
EOSINOPHIL NFR BLD AUTO: 1.3 %
GLUCOSE 1H P 50 G GLC PO SERPL-MCNC: 106 MG/DL
GLUCOSE UR-MCNC: NORMAL
HBV SURFACE AG SER QL: NONREACTIVE
HCG UR QL: 1 EU/DL
HCT VFR BLD CALC: 31.7 %
HGB BLD-MCNC: 10.2 G/DL
HGB UR QL STRIP.AUTO: NORMAL
HIV1+2 AB SPEC QL IA.RAPID: NONREACTIVE
IMM GRANULOCYTES NFR BLD AUTO: 3.6 %
KETONES UR-MCNC: NORMAL
LEUKOCYTE ESTERASE UR QL STRIP: NORMAL
LYMPHOCYTES # BLD AUTO: 1.86 K/UL
LYMPHOCYTES NFR BLD AUTO: 21.1 %
MAN DIFF?: NORMAL
MCHC RBC-ENTMCNC: 32 PG
MCHC RBC-ENTMCNC: 32.2 GM/DL
MCV RBC AUTO: 99.4 FL
MONOCYTES # BLD AUTO: 0.76 K/UL
MONOCYTES NFR BLD AUTO: 8.6 %
NEUTROPHILS # BLD AUTO: 5.72 K/UL
NEUTROPHILS NFR BLD AUTO: 65.1 %
NITRITE UR QL STRIP: NORMAL
PH UR STRIP: 7
PLATELET # BLD AUTO: 186 K/UL
PROT UR STRIP-MCNC: NORMAL
RBC # BLD: 3.19 M/UL
RBC # FLD: 14.8 %
SP GR UR STRIP: 1.02
T PALLIDUM AB SER QL IA: NEGATIVE
WBC # FLD AUTO: 8.8 K/UL

## 2023-04-02 ENCOUNTER — OUTPATIENT (OUTPATIENT)
Dept: INPATIENT UNIT | Facility: HOSPITAL | Age: 28
LOS: 1 days | Discharge: HOME | End: 2023-04-02
Payer: COMMERCIAL

## 2023-04-02 DIAGNOSIS — Z3A.00 WEEKS OF GESTATION OF PREGNANCY NOT SPECIFIED: ICD-10-CM

## 2023-04-02 PROCEDURE — 99214 OFFICE O/P EST MOD 30 MIN: CPT

## 2023-04-02 PROCEDURE — 59025 FETAL NON-STRESS TEST: CPT

## 2023-04-03 DIAGNOSIS — O26.899 OTHER SPECIFIED PREGNANCY RELATED CONDITIONS, UNSPECIFIED TRIMESTER: ICD-10-CM

## 2023-04-03 DIAGNOSIS — Z3A.00 WEEKS OF GESTATION OF PREGNANCY NOT SPECIFIED: ICD-10-CM

## 2023-04-05 ENCOUNTER — NON-APPOINTMENT (OUTPATIENT)
Age: 28
End: 2023-04-05

## 2023-04-06 ENCOUNTER — NON-APPOINTMENT (OUTPATIENT)
Age: 28
End: 2023-04-06

## 2023-04-06 ENCOUNTER — APPOINTMENT (OUTPATIENT)
Dept: OBGYN | Facility: CLINIC | Age: 28
End: 2023-04-06
Payer: COMMERCIAL

## 2023-04-06 VITALS
TEMPERATURE: 98 F | RESPIRATION RATE: 16 BRPM | WEIGHT: 218 LBS | BODY MASS INDEX: 38.62 KG/M2 | DIASTOLIC BLOOD PRESSURE: 64 MMHG | HEIGHT: 63 IN | SYSTOLIC BLOOD PRESSURE: 104 MMHG | HEART RATE: 78 BPM

## 2023-04-06 PROCEDURE — 81003 URINALYSIS AUTO W/O SCOPE: CPT | Mod: QW

## 2023-04-06 PROCEDURE — 0502F SUBSEQUENT PRENATAL CARE: CPT

## 2023-04-07 LAB
BILIRUB UR QL STRIP: NORMAL
GLUCOSE UR-MCNC: NORMAL
HCG UR QL: 1 EU/DL
HGB UR QL STRIP.AUTO: NORMAL
KETONES UR-MCNC: NORMAL
LEUKOCYTE ESTERASE UR QL STRIP: NORMAL
NITRITE UR QL STRIP: NORMAL
PH UR STRIP: 7
PROT UR STRIP-MCNC: NORMAL
SP GR UR STRIP: 1.02

## 2023-04-17 ENCOUNTER — NON-APPOINTMENT (OUTPATIENT)
Age: 28
End: 2023-04-17

## 2023-04-18 ENCOUNTER — NON-APPOINTMENT (OUTPATIENT)
Age: 28
End: 2023-04-18

## 2023-04-18 ENCOUNTER — APPOINTMENT (OUTPATIENT)
Dept: ANTEPARTUM | Facility: CLINIC | Age: 28
End: 2023-04-18
Payer: COMMERCIAL

## 2023-04-18 ENCOUNTER — ASOB RESULT (OUTPATIENT)
Age: 28
End: 2023-04-18

## 2023-04-18 PROCEDURE — 76816 OB US FOLLOW-UP PER FETUS: CPT

## 2023-04-20 ENCOUNTER — APPOINTMENT (OUTPATIENT)
Dept: OBGYN | Facility: CLINIC | Age: 28
End: 2023-04-20
Payer: COMMERCIAL

## 2023-04-20 VITALS
HEIGHT: 63 IN | DIASTOLIC BLOOD PRESSURE: 60 MMHG | SYSTOLIC BLOOD PRESSURE: 110 MMHG | BODY MASS INDEX: 39.16 KG/M2 | WEIGHT: 221 LBS

## 2023-04-20 PROCEDURE — 99212 OFFICE O/P EST SF 10 MIN: CPT

## 2023-04-20 PROCEDURE — ZZZZZ: CPT

## 2023-04-26 ENCOUNTER — NON-APPOINTMENT (OUTPATIENT)
Age: 28
End: 2023-04-26

## 2023-05-04 ENCOUNTER — APPOINTMENT (OUTPATIENT)
Dept: OBGYN | Facility: CLINIC | Age: 28
End: 2023-05-04
Payer: MEDICAID

## 2023-05-04 VITALS
RESPIRATION RATE: 16 BRPM | SYSTOLIC BLOOD PRESSURE: 108 MMHG | WEIGHT: 221 LBS | HEIGHT: 63 IN | DIASTOLIC BLOOD PRESSURE: 64 MMHG | BODY MASS INDEX: 39.16 KG/M2

## 2023-05-04 PROCEDURE — 99212 OFFICE O/P EST SF 10 MIN: CPT

## 2023-05-16 ENCOUNTER — APPOINTMENT (OUTPATIENT)
Dept: ANTEPARTUM | Facility: CLINIC | Age: 28
End: 2023-05-16
Payer: MEDICAID

## 2023-05-16 ENCOUNTER — ASOB RESULT (OUTPATIENT)
Age: 28
End: 2023-05-16

## 2023-05-16 PROCEDURE — 76819 FETAL BIOPHYS PROFIL W/O NST: CPT

## 2023-05-16 PROCEDURE — 76816 OB US FOLLOW-UP PER FETUS: CPT

## 2023-05-17 ENCOUNTER — NON-APPOINTMENT (OUTPATIENT)
Age: 28
End: 2023-05-17

## 2023-05-18 ENCOUNTER — APPOINTMENT (OUTPATIENT)
Dept: OBGYN | Facility: CLINIC | Age: 28
End: 2023-05-18
Payer: MEDICAID

## 2023-05-18 ENCOUNTER — INPATIENT (INPATIENT)
Facility: HOSPITAL | Age: 28
LOS: 0 days | Discharge: HOME | DRG: 566 | End: 2023-05-19
Attending: OBSTETRICS & GYNECOLOGY | Admitting: OBSTETRICS & GYNECOLOGY
Payer: MEDICAID

## 2023-05-18 VITALS
WEIGHT: 221 LBS | BODY MASS INDEX: 39.16 KG/M2 | DIASTOLIC BLOOD PRESSURE: 60 MMHG | SYSTOLIC BLOOD PRESSURE: 106 MMHG | HEIGHT: 63 IN

## 2023-05-18 DIAGNOSIS — Z34.93 ENCOUNTER FOR SUPERVISION OF NORMAL PREGNANCY, UNSPECIFIED, THIRD TRIMESTER: ICD-10-CM

## 2023-05-18 DIAGNOSIS — O26.899 OTHER SPECIFIED PREGNANCY RELATED CONDITIONS, UNSPECIFIED TRIMESTER: ICD-10-CM

## 2023-05-18 PROCEDURE — 99214 OFFICE O/P EST MOD 30 MIN: CPT

## 2023-05-18 PROCEDURE — 86780 TREPONEMA PALLIDUM: CPT

## 2023-05-18 PROCEDURE — 99212 OFFICE O/P EST SF 10 MIN: CPT | Mod: 25

## 2023-05-18 PROCEDURE — 81003 URINALYSIS AUTO W/O SCOPE: CPT | Mod: QW

## 2023-05-18 PROCEDURE — 76815 OB US LIMITED FETUS(S): CPT

## 2023-05-18 PROCEDURE — 76819 FETAL BIOPHYS PROFIL W/O NST: CPT

## 2023-05-18 PROCEDURE — 86901 BLOOD TYPING SEROLOGIC RH(D): CPT

## 2023-05-18 PROCEDURE — 82143 AMNIOTIC FLUID SCAN: CPT

## 2023-05-18 PROCEDURE — 36415 COLL VENOUS BLD VENIPUNCTURE: CPT

## 2023-05-18 PROCEDURE — 99221 1ST HOSP IP/OBS SF/LOW 40: CPT | Mod: 25

## 2023-05-18 PROCEDURE — 76819 FETAL BIOPHYS PROFIL W/O NST: CPT | Mod: 26

## 2023-05-18 PROCEDURE — 86850 RBC ANTIBODY SCREEN: CPT

## 2023-05-18 PROCEDURE — 59050 FETAL MONITOR W/REPORT: CPT

## 2023-05-18 PROCEDURE — 86900 BLOOD TYPING SEROLOGIC ABO: CPT

## 2023-05-18 PROCEDURE — 85025 COMPLETE CBC W/AUTO DIFF WBC: CPT

## 2023-05-18 PROCEDURE — 76815 OB US LIMITED FETUS(S): CPT | Mod: 26

## 2023-05-18 PROCEDURE — 59025 FETAL NON-STRESS TEST: CPT

## 2023-05-19 VITALS — WEIGHT: 220.9 LBS | HEIGHT: 63 IN

## 2023-05-19 LAB
BASOPHILS # BLD AUTO: 0.03 K/UL — SIGNIFICANT CHANGE UP (ref 0–0.2)
BASOPHILS NFR BLD AUTO: 0.4 % — SIGNIFICANT CHANGE UP (ref 0–2)
EOSINOPHIL # BLD AUTO: 0.17 K/UL — SIGNIFICANT CHANGE UP (ref 0–0.5)
EOSINOPHIL NFR BLD AUTO: 2.1 % — SIGNIFICANT CHANGE UP (ref 0–6)
HCT VFR BLD CALC: 36.2 % — SIGNIFICANT CHANGE UP (ref 34.5–45)
HGB BLD-MCNC: 12.3 G/DL — SIGNIFICANT CHANGE UP (ref 11.5–15.5)
IMM GRANULOCYTES NFR BLD AUTO: 1.5 % — HIGH (ref 0–0.9)
LYMPHOCYTES # BLD AUTO: 2.14 K/UL — SIGNIFICANT CHANGE UP (ref 1–3.3)
LYMPHOCYTES # BLD AUTO: 26.8 % — SIGNIFICANT CHANGE UP (ref 13–44)
MCHC RBC-ENTMCNC: 31.1 PG — SIGNIFICANT CHANGE UP (ref 27–34)
MCHC RBC-ENTMCNC: 34 GM/DL — SIGNIFICANT CHANGE UP (ref 32–36)
MCV RBC AUTO: 91.6 FL — SIGNIFICANT CHANGE UP (ref 80–100)
MONOCYTES # BLD AUTO: 0.66 K/UL — SIGNIFICANT CHANGE UP (ref 0–0.9)
MONOCYTES NFR BLD AUTO: 8.3 % — SIGNIFICANT CHANGE UP (ref 2–14)
NEUTROPHILS # BLD AUTO: 4.86 K/UL — SIGNIFICANT CHANGE UP (ref 1.8–7.4)
NEUTROPHILS NFR BLD AUTO: 60.9 % — SIGNIFICANT CHANGE UP (ref 43–77)
PLATELET # BLD AUTO: 195 K/UL — SIGNIFICANT CHANGE UP (ref 150–400)
RBC # BLD: 3.95 M/UL — SIGNIFICANT CHANGE UP (ref 3.8–5.2)
RBC # FLD: 14.5 % — SIGNIFICANT CHANGE UP (ref 10.3–14.5)
T PALLIDUM AB TITR SER: NEGATIVE — SIGNIFICANT CHANGE UP
WBC # BLD: 7.98 K/UL — SIGNIFICANT CHANGE UP (ref 3.8–10.5)
WBC # FLD AUTO: 7.98 K/UL — SIGNIFICANT CHANGE UP (ref 3.8–10.5)

## 2023-05-19 PROCEDURE — 76819 FETAL BIOPHYS PROFIL W/O NST: CPT | Mod: 26

## 2023-05-19 RX ORDER — CHLORHEXIDINE GLUCONATE 213 G/1000ML
1 SOLUTION TOPICAL DAILY
Refills: 0 | Status: DISCONTINUED | OUTPATIENT
Start: 2023-05-19 | End: 2023-05-19

## 2023-05-19 RX ORDER — CITRIC ACID/SODIUM CITRATE 300-500 MG
30 SOLUTION, ORAL ORAL ONCE
Refills: 0 | Status: DISCONTINUED | OUTPATIENT
Start: 2023-05-19 | End: 2023-05-19

## 2023-05-19 RX ORDER — OXYTOCIN 10 UNIT/ML
333.33 VIAL (ML) INJECTION
Qty: 20 | Refills: 0 | Status: DISCONTINUED | OUTPATIENT
Start: 2023-05-19 | End: 2023-05-19

## 2023-05-19 RX ORDER — SODIUM CHLORIDE 9 MG/ML
1000 INJECTION, SOLUTION INTRAVENOUS
Refills: 0 | Status: DISCONTINUED | OUTPATIENT
Start: 2023-05-19 | End: 2023-05-19

## 2023-05-19 NOTE — DISCHARGE NOTE OB - PATIENT PORTAL LINK FT
You can access the FollowMyHealth Patient Portal offered by [facility] by registering at the following website: [email or web address]. By joining [facility]’s FollowMyHealth portal, you will also be able to view your health information using other applications (apps) compatible with our system.

## 2023-05-19 NOTE — DISCHARGE NOTE OB - CARE PROVIDER_API CALL
Susana Herndon)  Obstetrics and Gynecology  [address] [place] [address]  Phone: [phone]  Fax: [phone]  Follow Up Time:

## 2023-05-19 NOTE — PATIENT PROFILE OB - FALL HARM RISK - UNIVERSAL INTERVENTIONS
no rebound or guarding. no cva ttp./soft/tender.../nondistended
Bed in lowest position, wheels locked, appropriate side rails in place/Call bell, personal items and telephone in reach/Instruct patient to call for assistance before getting out of bed or chair/Non-slip footwear when patient is out of bed/[place] to call system/Physically safe environment - no spills, clutter or unnecessary equipment/Purposeful Proactive Rounding/Room/bathroom lighting operational, light cord in reach

## 2023-05-19 NOTE — DISCHARGE NOTE OB - PLAN OF CARE
Patient is a 28 y/o  @ 37 wk initially presented to L&D from doctor's office for NRFHT. During prolonged monitoring, FHR tracing recovered while on L&D. BPP performed which was 6/8  (-2 for breathing). Patient observed overnight. Initially noted to have nonfrequent ctx that were spread out but then trasnitioned to back pain and abdominal tightness, more frequent. Patient denied LOF or VB. Pt reported good fetal movement. BPP was repeated the following AM and scored 8/8 and had a reassuring during her hospital course. Pt to be discharged with close OB f/u and the should follow up with Dr. Herndon on 2023 Patient is a 26 y/o  @ 37 wk initially presented to L&D from doctor's office for NRFHT. During prolonged monitoring, FHR reassuring while on L&D. BPP performed which was 6/8  (-2 for breathing). Patient observed overnight. Incidentally found to be 2 cm, progressed to 3 cm but only 30% effaced and has not made any further cervical change. Contractions spaced out and patient is comfortable.  BPP was repeated the following AM and scored 8/8, reassuring tracing while in L+D. Case discussed with Dr Herndon and Dr Mitchell. Pt to be discharged with close OB f/u and the should follow up with Dr. Herndon on 2023

## 2023-05-19 NOTE — DISCHARGE NOTE OB - PROVIDER TOKENS
60yo F PMHx breast CA sp lumpectomy, etoh abuse, hepatitis, HTN, sent by GI for jaundice and abd distention.     #Etoh liver cirrhosis with ascites  #Anemia, thrombocytopenia  CT a/p Cirrhosis, small to moderate volume abdominopelvic ascites, and sequela of portal hypertension. Contracted gallbladder with gallstones  sp diagnostic/ therapeutic paracentesis, cultures NTD  ABD US no PVT  doppler LE no dvt  completed ceftriaxone 3 days for UTI, no signs of SBP  daily CMP and INR  transfuse hgb <7  fu hepatology recs--started on prednisone; but bilirubin continues to rise; candidate for transplant?  miralax BId, add tap water enema, lactulose if no BM    # ELIZABETH  renal following, renal US no hydro    # breast CA sp lumpectomy  was taking letrozole now on hold, outpt fu    # HTN  meds on hold for hypotension    dvt ppx HSQ    dw  at bedside    Please call [facility] with questions [phone]. PROVIDER:[TOKEN:[[id number]]]

## 2023-05-19 NOTE — DISCHARGE NOTE OB - HOSPITAL COURSE
Patient is a 26 y/o  @ 37 wk initially presented to L&D from doctor's office for NRFHT. During prolonged monitoring, FHR tracing recovered while on L&D. BPP performed which was 6/8  (-2 for breathing) on the 2023. Patient observed overnight. Initially noted to have nonfrequent ctx that were spread out but then trasnitioned to back pain and abdominal tightness, more frequent. Patient denied LOF or VB. Pt reported good fetal movement. BPP was repeated the following AM on 23 and scored 8/8 and had a reassuring during her hospital course. Pt to be discharged with close OB f/u Patient is a  26 y/o  @ 37 wk initially presented to L&D from doctor's office for NRFHT. During prolonged monitoring, FHR reassuring while on L&D. BPP performed which was 6/8  (-2 for breathing). Patient observed overnight. Incidentally found to be 2 cm, progressed to 3 cm but only 30% effaced and has not made any further cervical change since. Contractions spaced out and patient is comfortable.  BPP was repeated the following AM and scored 8/8, reassuring tracing while in L+D. Case discussed with Dr Herndon and Dr Mitchell. Pt to be discharged with close OB f/u and the should follow up with Dr. Herndon on 2023. ED, fetal kick count instructions given.

## 2023-05-19 NOTE — DISCHARGE NOTE OB - REASON FOR ADMISSION
Non reassuring FHR in office and BPP of 6/8 in L&D eval  MOnday 22 Non reassuring FHR in office and BPP of 6/8 in L&D eval

## 2023-05-19 NOTE — DISCHARGE NOTE OB - CARE PLAN
1 Principal Discharge DX:	Non-reassuring electronic fetal monitoring tracing  Assessment and plan of treatment:	Patient is a 26 y/o  @ 37 wk initially presented to L&D from doctor's office for NRFHT. During prolonged monitoring, FHR tracing recovered while on L&D. BPP performed which was 6/8  (-2 for breathing). Patient observed overnight. Initially noted to have nonfrequent ctx that were spread out but then trasnitioned to back pain and abdominal tightness, more frequent. Patient denied LOF or VB. Pt reported good fetal movement. BPP was repeated the following AM and scored 8/8 and had a reassuring during her hospital course. Pt to be discharged with close OB f/u and the should follow up with Dr. Herndon on 2023   Principal Discharge DX:	Non-reassuring electronic fetal monitoring tracing  Assessment and plan of treatment:	Patient is a 26 y/o  @ 37 wk initially presented to L&D from doctor's office for NRFHT. During prolonged monitoring, FHR reassuring while on L&D. BPP performed which was 6/8  (-2 for breathing). Patient observed overnight. Incidentally found to be 2 cm, progressed to 3 cm but only 30% effaced and has not made any further cervical change. Contractions spaced out and patient is comfortable.  BPP was repeated the following AM and scored 8/8, reassuring tracing while in L+D. Case discussed with Dr Herndon and Dr Mitchell. Pt to be discharged with close OB f/u and the should follow up with Dr. Herndon on 2023

## 2023-05-22 ENCOUNTER — APPOINTMENT (OUTPATIENT)
Dept: OBGYN | Facility: CLINIC | Age: 28
End: 2023-05-22
Payer: MEDICAID

## 2023-05-22 VITALS
SYSTOLIC BLOOD PRESSURE: 104 MMHG | BODY MASS INDEX: 38.8 KG/M2 | HEIGHT: 63 IN | DIASTOLIC BLOOD PRESSURE: 62 MMHG | WEIGHT: 219 LBS | RESPIRATION RATE: 16 BRPM

## 2023-05-22 DIAGNOSIS — Z3A.37 37 WEEKS GESTATION OF PREGNANCY: ICD-10-CM

## 2023-05-22 DIAGNOSIS — O36.8130 DECREASED FETAL MOVEMENTS, THIRD TRIMESTER, NOT APPLICABLE OR UNSPECIFIED: ICD-10-CM

## 2023-05-22 PROCEDURE — 59025 FETAL NON-STRESS TEST: CPT

## 2023-05-22 PROCEDURE — 99212 OFFICE O/P EST SF 10 MIN: CPT | Mod: 25

## 2023-05-24 LAB
GP B STREP DNA SPEC QL NAA+PROBE: NOT DETECTED
HIV1+2 AB SPEC QL IA.RAPID: NONREACTIVE
SOURCE GBS: NORMAL

## 2023-05-31 ENCOUNTER — NON-APPOINTMENT (OUTPATIENT)
Age: 28
End: 2023-05-31

## 2023-06-01 ENCOUNTER — APPOINTMENT (OUTPATIENT)
Dept: OBGYN | Facility: CLINIC | Age: 28
End: 2023-06-01
Payer: MEDICAID

## 2023-06-01 ENCOUNTER — INPATIENT (INPATIENT)
Facility: HOSPITAL | Age: 28
LOS: 2 days | Discharge: HOME | DRG: 560 | End: 2023-06-04
Attending: OBSTETRICS & GYNECOLOGY | Admitting: OBSTETRICS & GYNECOLOGY
Payer: MEDICAID

## 2023-06-01 VITALS
SYSTOLIC BLOOD PRESSURE: 106 MMHG | DIASTOLIC BLOOD PRESSURE: 62 MMHG | HEIGHT: 63 IN | BODY MASS INDEX: 39.16 KG/M2 | WEIGHT: 221 LBS

## 2023-06-01 VITALS — WEIGHT: 220.02 LBS | HEIGHT: 63 IN

## 2023-06-01 DIAGNOSIS — Z3A.00 WEEKS OF GESTATION OF PREGNANCY NOT SPECIFIED: ICD-10-CM

## 2023-06-01 LAB
BASOPHILS # BLD AUTO: 0 K/UL — SIGNIFICANT CHANGE UP (ref 0–0.2)
BASOPHILS NFR BLD AUTO: 0 % — SIGNIFICANT CHANGE UP (ref 0–2)
BLD GP AB SCN SERPL QL: SIGNIFICANT CHANGE UP
EOSINOPHIL # BLD AUTO: 0 K/UL — SIGNIFICANT CHANGE UP (ref 0–0.5)
EOSINOPHIL NFR BLD AUTO: 0 % — SIGNIFICANT CHANGE UP (ref 0–6)
HCT VFR BLD CALC: 34.8 % — SIGNIFICANT CHANGE UP (ref 34.5–45)
HGB BLD-MCNC: 11.7 G/DL — SIGNIFICANT CHANGE UP (ref 11.5–15.5)
LYMPHOCYTES # BLD AUTO: 2.96 K/UL — SIGNIFICANT CHANGE UP (ref 1–3.3)
LYMPHOCYTES # BLD AUTO: 32 % — SIGNIFICANT CHANGE UP (ref 13–44)
MANUAL SMEAR VERIFICATION: SIGNIFICANT CHANGE UP
MCHC RBC-ENTMCNC: 31.1 PG — SIGNIFICANT CHANGE UP (ref 27–34)
MCHC RBC-ENTMCNC: 33.6 GM/DL — SIGNIFICANT CHANGE UP (ref 32–36)
MCV RBC AUTO: 92.6 FL — SIGNIFICANT CHANGE UP (ref 80–100)
MONOCYTES # BLD AUTO: 0.56 K/UL — SIGNIFICANT CHANGE UP (ref 0–0.9)
MONOCYTES NFR BLD AUTO: 6 % — SIGNIFICANT CHANGE UP (ref 2–14)
MYELOCYTES NFR BLD: 1 % — HIGH (ref 0–0)
NEUTROPHILS # BLD AUTO: 5.65 K/UL — SIGNIFICANT CHANGE UP (ref 1.8–7.4)
NEUTROPHILS NFR BLD AUTO: 61 % — SIGNIFICANT CHANGE UP (ref 43–77)
NRBC # BLD: 0 /100 — SIGNIFICANT CHANGE UP (ref 0–0)
NRBC # BLD: SIGNIFICANT CHANGE UP /100 WBCS (ref 0–0)
PLAT MORPH BLD: NORMAL — SIGNIFICANT CHANGE UP
PLATELET # BLD AUTO: 177 K/UL — SIGNIFICANT CHANGE UP (ref 150–400)
RBC # BLD: 3.76 M/UL — LOW (ref 3.8–5.2)
RBC # FLD: 14.5 % — SIGNIFICANT CHANGE UP (ref 10.3–14.5)
RBC BLD AUTO: NORMAL — SIGNIFICANT CHANGE UP
WBC # BLD: 9.26 K/UL — SIGNIFICANT CHANGE UP (ref 3.8–10.5)
WBC # FLD AUTO: 9.26 K/UL — SIGNIFICANT CHANGE UP (ref 3.8–10.5)

## 2023-06-01 PROCEDURE — 85025 COMPLETE CBC W/AUTO DIFF WBC: CPT

## 2023-06-01 PROCEDURE — 85018 HEMOGLOBIN: CPT

## 2023-06-01 PROCEDURE — 76819 FETAL BIOPHYS PROFIL W/O NST: CPT | Mod: 26

## 2023-06-01 PROCEDURE — 94760 N-INVAS EAR/PLS OXIMETRY 1: CPT

## 2023-06-01 PROCEDURE — 99214 OFFICE O/P EST MOD 30 MIN: CPT

## 2023-06-01 PROCEDURE — 85014 HEMATOCRIT: CPT

## 2023-06-01 PROCEDURE — 59025 FETAL NON-STRESS TEST: CPT

## 2023-06-01 PROCEDURE — 86762 RUBELLA ANTIBODY: CPT

## 2023-06-01 PROCEDURE — 86900 BLOOD TYPING SEROLOGIC ABO: CPT

## 2023-06-01 PROCEDURE — 76815 OB US LIMITED FETUS(S): CPT | Mod: 26

## 2023-06-01 PROCEDURE — 86850 RBC ANTIBODY SCREEN: CPT

## 2023-06-01 PROCEDURE — C1889: CPT

## 2023-06-01 PROCEDURE — 59050 FETAL MONITOR W/REPORT: CPT

## 2023-06-01 PROCEDURE — 0502F SUBSEQUENT PRENATAL CARE: CPT

## 2023-06-01 PROCEDURE — 88307 TISSUE EXAM BY PATHOLOGIST: CPT

## 2023-06-01 PROCEDURE — 36415 COLL VENOUS BLD VENIPUNCTURE: CPT

## 2023-06-01 PROCEDURE — 76819 FETAL BIOPHYS PROFIL W/O NST: CPT

## 2023-06-01 PROCEDURE — 86780 TREPONEMA PALLIDUM: CPT

## 2023-06-01 PROCEDURE — 86901 BLOOD TYPING SEROLOGIC RH(D): CPT

## 2023-06-01 PROCEDURE — 76815 OB US LIMITED FETUS(S): CPT

## 2023-06-01 RX ORDER — OXYTOCIN 10 UNIT/ML
333.33 VIAL (ML) INJECTION
Qty: 20 | Refills: 0 | Status: COMPLETED | OUTPATIENT
Start: 2023-06-01 | End: 2023-06-01

## 2023-06-01 RX ORDER — CHLORHEXIDINE GLUCONATE 213 G/1000ML
1 SOLUTION TOPICAL DAILY
Refills: 0 | Status: DISCONTINUED | OUTPATIENT
Start: 2023-06-01 | End: 2023-06-02

## 2023-06-01 RX ORDER — OXYTOCIN 10 UNIT/ML
VIAL (ML) INJECTION
Qty: 30 | Refills: 0 | Status: DISCONTINUED | OUTPATIENT
Start: 2023-06-01 | End: 2023-06-02

## 2023-06-01 RX ORDER — CITRIC ACID/SODIUM CITRATE 300-500 MG
15 SOLUTION, ORAL ORAL EVERY 6 HOURS
Refills: 0 | Status: DISCONTINUED | OUTPATIENT
Start: 2023-06-01 | End: 2023-06-02

## 2023-06-01 RX ORDER — SODIUM CHLORIDE 9 MG/ML
1000 INJECTION, SOLUTION INTRAVENOUS
Refills: 0 | Status: DISCONTINUED | OUTPATIENT
Start: 2023-06-01 | End: 2023-06-02

## 2023-06-01 RX ADMIN — Medication 2 MILLIUNIT(S)/MIN: at 17:50

## 2023-06-02 ENCOUNTER — RESULT REVIEW (OUTPATIENT)
Age: 28
End: 2023-06-02

## 2023-06-02 LAB
RUBV IGG SER-ACNC: 3.9 INDEX — SIGNIFICANT CHANGE UP
RUBV IGG SER-IMP: POSITIVE — SIGNIFICANT CHANGE UP
T PALLIDUM AB TITR SER: NEGATIVE — SIGNIFICANT CHANGE UP

## 2023-06-02 PROCEDURE — 59409 OBSTETRICAL CARE: CPT | Mod: U9

## 2023-06-02 PROCEDURE — 88307 TISSUE EXAM BY PATHOLOGIST: CPT | Mod: 26

## 2023-06-02 RX ORDER — SIMETHICONE 80 MG/1
80 TABLET, CHEWABLE ORAL EVERY 4 HOURS
Refills: 0 | Status: DISCONTINUED | OUTPATIENT
Start: 2023-06-02 | End: 2023-06-04

## 2023-06-02 RX ORDER — IBUPROFEN 200 MG
600 TABLET ORAL EVERY 6 HOURS
Refills: 0 | Status: COMPLETED | OUTPATIENT
Start: 2023-06-02 | End: 2024-04-30

## 2023-06-02 RX ORDER — OXYCODONE HYDROCHLORIDE 5 MG/1
5 TABLET ORAL ONCE
Refills: 0 | Status: DISCONTINUED | OUTPATIENT
Start: 2023-06-02 | End: 2023-06-04

## 2023-06-02 RX ORDER — TETANUS TOXOID, REDUCED DIPHTHERIA TOXOID AND ACELLULAR PERTUSSIS VACCINE, ADSORBED 5; 2.5; 8; 8; 2.5 [IU]/.5ML; [IU]/.5ML; UG/.5ML; UG/.5ML; UG/.5ML
0.5 SUSPENSION INTRAMUSCULAR ONCE
Refills: 0 | Status: DISCONTINUED | OUTPATIENT
Start: 2023-06-02 | End: 2023-06-04

## 2023-06-02 RX ORDER — HYDROCORTISONE 1 %
1 OINTMENT (GRAM) TOPICAL EVERY 6 HOURS
Refills: 0 | Status: DISCONTINUED | OUTPATIENT
Start: 2023-06-02 | End: 2023-06-04

## 2023-06-02 RX ORDER — OXYTOCIN 10 UNIT/ML
41.67 VIAL (ML) INJECTION
Qty: 20 | Refills: 0 | Status: DISCONTINUED | OUTPATIENT
Start: 2023-06-02 | End: 2023-06-04

## 2023-06-02 RX ORDER — SODIUM CHLORIDE 9 MG/ML
500 INJECTION, SOLUTION INTRAVENOUS ONCE
Refills: 0 | Status: COMPLETED | OUTPATIENT
Start: 2023-06-02 | End: 2023-06-02

## 2023-06-02 RX ORDER — OXYCODONE HYDROCHLORIDE 5 MG/1
5 TABLET ORAL
Refills: 0 | Status: DISCONTINUED | OUTPATIENT
Start: 2023-06-02 | End: 2023-06-04

## 2023-06-02 RX ORDER — ACETAMINOPHEN 500 MG
975 TABLET ORAL
Refills: 0 | Status: DISCONTINUED | OUTPATIENT
Start: 2023-06-02 | End: 2023-06-04

## 2023-06-02 RX ORDER — ACETAMINOPHEN 500 MG
1000 TABLET ORAL ONCE
Refills: 0 | Status: COMPLETED | OUTPATIENT
Start: 2023-06-02 | End: 2023-06-02

## 2023-06-02 RX ORDER — BENZOCAINE 10 %
1 GEL (GRAM) MUCOUS MEMBRANE EVERY 6 HOURS
Refills: 0 | Status: DISCONTINUED | OUTPATIENT
Start: 2023-06-02 | End: 2023-06-04

## 2023-06-02 RX ORDER — IBUPROFEN 200 MG
600 TABLET ORAL EVERY 6 HOURS
Refills: 0 | Status: DISCONTINUED | OUTPATIENT
Start: 2023-06-02 | End: 2023-06-04

## 2023-06-02 RX ORDER — DIPHENHYDRAMINE HCL 50 MG
25 CAPSULE ORAL EVERY 6 HOURS
Refills: 0 | Status: DISCONTINUED | OUTPATIENT
Start: 2023-06-02 | End: 2023-06-04

## 2023-06-02 RX ORDER — LANOLIN
1 OINTMENT (GRAM) TOPICAL EVERY 6 HOURS
Refills: 0 | Status: DISCONTINUED | OUTPATIENT
Start: 2023-06-02 | End: 2023-06-04

## 2023-06-02 RX ORDER — MAGNESIUM HYDROXIDE 400 MG/1
30 TABLET, CHEWABLE ORAL
Refills: 0 | Status: DISCONTINUED | OUTPATIENT
Start: 2023-06-02 | End: 2023-06-04

## 2023-06-02 RX ORDER — PRAMOXINE HYDROCHLORIDE 150 MG/15G
1 AEROSOL, FOAM RECTAL EVERY 4 HOURS
Refills: 0 | Status: DISCONTINUED | OUTPATIENT
Start: 2023-06-02 | End: 2023-06-04

## 2023-06-02 RX ORDER — AER TRAVELER 0.5 G/1
1 SOLUTION RECTAL; TOPICAL EVERY 4 HOURS
Refills: 0 | Status: DISCONTINUED | OUTPATIENT
Start: 2023-06-02 | End: 2023-06-04

## 2023-06-02 RX ORDER — KETOROLAC TROMETHAMINE 30 MG/ML
30 SYRINGE (ML) INJECTION ONCE
Refills: 0 | Status: DISCONTINUED | OUTPATIENT
Start: 2023-06-02 | End: 2023-06-02

## 2023-06-02 RX ORDER — DIBUCAINE 1 %
1 OINTMENT (GRAM) RECTAL EVERY 6 HOURS
Refills: 0 | Status: DISCONTINUED | OUTPATIENT
Start: 2023-06-02 | End: 2023-06-04

## 2023-06-02 RX ORDER — SODIUM CHLORIDE 9 MG/ML
3 INJECTION INTRAMUSCULAR; INTRAVENOUS; SUBCUTANEOUS EVERY 8 HOURS
Refills: 0 | Status: DISCONTINUED | OUTPATIENT
Start: 2023-06-02 | End: 2023-06-03

## 2023-06-02 RX ADMIN — Medication 975 MILLIGRAM(S): at 22:00

## 2023-06-02 RX ADMIN — SODIUM CHLORIDE 3 MILLILITER(S): 9 INJECTION INTRAMUSCULAR; INTRAVENOUS; SUBCUTANEOUS at 21:21

## 2023-06-02 RX ADMIN — Medication 600 MILLIGRAM(S): at 23:51

## 2023-06-02 RX ADMIN — Medication 30 MILLIGRAM(S): at 07:29

## 2023-06-02 RX ADMIN — SODIUM CHLORIDE 3 MILLILITER(S): 9 INJECTION INTRAMUSCULAR; INTRAVENOUS; SUBCUTANEOUS at 14:03

## 2023-06-02 RX ADMIN — Medication 0.2 MILLIGRAM(S): at 18:00

## 2023-06-02 RX ADMIN — Medication 400 MILLIGRAM(S): at 04:00

## 2023-06-02 RX ADMIN — Medication 975 MILLIGRAM(S): at 15:25

## 2023-06-02 RX ADMIN — Medication 600 MILLIGRAM(S): at 13:19

## 2023-06-02 RX ADMIN — Medication 975 MILLIGRAM(S): at 14:05

## 2023-06-02 RX ADMIN — Medication 975 MILLIGRAM(S): at 21:00

## 2023-06-02 RX ADMIN — SODIUM CHLORIDE 500 MILLILITER(S): 9 INJECTION, SOLUTION INTRAVENOUS at 04:00

## 2023-06-02 RX ADMIN — Medication 975 MILLIGRAM(S): at 08:58

## 2023-06-02 RX ADMIN — Medication 1 TABLET(S): at 10:02

## 2023-06-02 RX ADMIN — Medication 30 MILLIGRAM(S): at 07:20

## 2023-06-02 RX ADMIN — Medication 0.2 MILLIGRAM(S): at 10:01

## 2023-06-02 RX ADMIN — Medication 600 MILLIGRAM(S): at 18:54

## 2023-06-02 RX ADMIN — Medication 0.2 MILLIGRAM(S): at 06:00

## 2023-06-02 RX ADMIN — Medication 1000 MILLIGRAM(S): at 07:31

## 2023-06-02 RX ADMIN — Medication 975 MILLIGRAM(S): at 09:59

## 2023-06-02 RX ADMIN — Medication 600 MILLIGRAM(S): at 18:00

## 2023-06-02 RX ADMIN — Medication 0.2 MILLIGRAM(S): at 22:05

## 2023-06-02 RX ADMIN — Medication 1000 MILLIUNIT(S)/MIN: at 07:33

## 2023-06-02 RX ADMIN — Medication 600 MILLIGRAM(S): at 12:07

## 2023-06-02 RX ADMIN — Medication 0.2 MILLIGRAM(S): at 14:00

## 2023-06-03 ENCOUNTER — TRANSCRIPTION ENCOUNTER (OUTPATIENT)
Age: 28
End: 2023-06-03

## 2023-06-03 LAB
HCT VFR BLD CALC: 31.7 % — LOW (ref 34.5–45)
HGB BLD-MCNC: 10.5 G/DL — LOW (ref 11.5–15.5)

## 2023-06-03 RX ORDER — IBUPROFEN 200 MG
1 TABLET ORAL
Qty: 0 | Refills: 0 | DISCHARGE
Start: 2023-06-03

## 2023-06-03 RX ORDER — ACETAMINOPHEN 500 MG
3 TABLET ORAL
Qty: 0 | Refills: 0 | DISCHARGE
Start: 2023-06-03

## 2023-06-03 RX ADMIN — Medication 975 MILLIGRAM(S): at 09:41

## 2023-06-03 RX ADMIN — Medication 975 MILLIGRAM(S): at 14:45

## 2023-06-03 RX ADMIN — Medication 600 MILLIGRAM(S): at 17:59

## 2023-06-03 RX ADMIN — Medication 975 MILLIGRAM(S): at 03:07

## 2023-06-03 RX ADMIN — Medication 600 MILLIGRAM(S): at 07:05

## 2023-06-03 RX ADMIN — Medication 1 TABLET(S): at 11:58

## 2023-06-03 RX ADMIN — Medication 600 MILLIGRAM(S): at 11:58

## 2023-06-03 RX ADMIN — Medication 600 MILLIGRAM(S): at 00:30

## 2023-06-03 RX ADMIN — SODIUM CHLORIDE 3 MILLILITER(S): 9 INJECTION INTRAMUSCULAR; INTRAVENOUS; SUBCUTANEOUS at 07:29

## 2023-06-03 RX ADMIN — Medication 0.2 MILLIGRAM(S): at 02:13

## 2023-06-03 RX ADMIN — Medication 975 MILLIGRAM(S): at 09:05

## 2023-06-03 RX ADMIN — Medication 975 MILLIGRAM(S): at 20:54

## 2023-06-03 RX ADMIN — Medication 600 MILLIGRAM(S): at 06:25

## 2023-06-03 RX ADMIN — Medication 975 MILLIGRAM(S): at 15:39

## 2023-06-03 RX ADMIN — Medication 600 MILLIGRAM(S): at 12:11

## 2023-06-03 RX ADMIN — Medication 975 MILLIGRAM(S): at 04:00

## 2023-06-03 RX ADMIN — Medication 600 MILLIGRAM(S): at 18:16

## 2023-06-03 NOTE — DISCHARGE NOTE OB - NS MD DC FALL RISK RISK
For information on Fall & Injury Prevention, visit: [email or web address] OR  [email or web address] OR  https://www.cdc.gov/steadi/patient.html

## 2023-06-03 NOTE — DISCHARGE NOTE OB - PLAN OF CARE
1) Please take Ibuprofen as needed for pain control  2) Nothing in the vagina for 6 weeks (including no sex, no tampons, and no douching).  3) Please call your doctor for a follow up your postpartum appointment in 4-6 weeks.  4) Please continue taking vitamins postpartum.   5) Please call the office sooner if you have heavy vaginal bleeding, severe abdominal pain, or fever > 100.4F.  6) You may resume regular activity as tolerated

## 2023-06-03 NOTE — DISCHARGE NOTE OB - CARE PROVIDER_API CALL
Susana Herndon  Obstetrics and Gynecology  [address] [place] [address]  Phone: [phone]  Fax: [phone]  Follow Up Time:

## 2023-06-03 NOTE — DISCHARGE NOTE OB - MEDICATION SUMMARY - MEDICATIONS TO TAKE
I will START or STAY ON the medications listed below when I get home from the hospital:    ibuprofen 600 mg oral tablet  -- 1 tab(s) by mouth every 6 hours  -- Indication: For     acetaminophen 325 mg oral tablet  -- 3 tab(s) by mouth 4 times a day as needed for  severe pain  -- Indication: For     Prenatal Multivitamins with Folic Acid 1 mg oral tablet  -- 1 tab(s) by mouth once a day  -- Indication: For postpartum

## 2023-06-03 NOTE — PROGRESS NOTE ADULT - ASSESSMENT
A/P:    -Vital signs stable  -Hgb: 10.5  -Voiding, tolerating PO, bowel function nml   -Advance care as tolerated   -Continue routine postpartum care and education  -Dispo: Pt is PPD 1. Continue routine postpartum care    Case d/w Dr. Campos

## 2023-06-03 NOTE — DISCHARGE NOTE OB - HOSPITAL COURSE
Patient underwent a normal spontaneous vaginal delivery at 38w6d. Fetus had cat II tracing. Had some degree of shoulder dystocia but otherwise uncomplicated delivery. Post-partum course was uncomplicated. Pain is well controlled with PRN medication. She has no difficulty with ambulation, voiding, or PO intake. Lab values and vital signs are within normal limits prior to discharge.

## 2023-06-03 NOTE — DISCHARGE NOTE OB - PATIENT PORTAL LINK FT
You can access the FollowMyHealth Patient Portal offered by [facility] by registering at the following website: [email or web address]. By joining [email or web address]’s FollowMyHealth portal, you will also be able to view your health information using other applications (apps) compatible with our system.

## 2023-06-03 NOTE — DISCHARGE NOTE OB - CARE PLAN
1 Principal Discharge DX:	 (normal spontaneous vaginal delivery)  Assessment and plan of treatment:	1) Please take Ibuprofen as needed for pain control  2) Nothing in the vagina for 6 weeks (including no sex, no tampons, and no douching).  3) Please call your doctor for a follow up your postpartum appointment in 4-6 weeks.  4) Please continue taking vitamins postpartum.   5) Please call the office sooner if you have heavy vaginal bleeding, severe abdominal pain, or fever > 100.4F.  6) You may resume regular activity as tolerated

## 2023-06-04 VITALS
HEART RATE: 73 BPM | OXYGEN SATURATION: 100 % | TEMPERATURE: 98 F | SYSTOLIC BLOOD PRESSURE: 95 MMHG | DIASTOLIC BLOOD PRESSURE: 50 MMHG | RESPIRATION RATE: 17 BRPM

## 2023-06-04 RX ADMIN — Medication 600 MILLIGRAM(S): at 06:37

## 2023-06-04 RX ADMIN — Medication 975 MILLIGRAM(S): at 02:51

## 2023-06-04 RX ADMIN — Medication 600 MILLIGRAM(S): at 01:02

## 2023-06-04 NOTE — PROGRESS NOTE ADULT - ASSESSMENT
A/P:   27y  now PPD2 s/p spontaneous vaginal delivery at 38 weeks 6 days gestation. Labour course complicated by mild shoulder dystocia. pt had PP uterine atony requiring LA cytotec.   -Vital signs stable  -Hgb: 10.5  -Voiding, tolerating PO, bowel function nml   -Advance care as tolerated   -Continue routine postpartum care and education  -Dispo: Pt is PPD 2, pt stable for discharg

## 2023-06-04 NOTE — PROGRESS NOTE ADULT - SUBJECTIVE AND OBJECTIVE BOX
EDGAR WEBBER is a 27y  now PPD2 s/p spontaneous vaginal delivery at 38 weeks 6 days gestation. Labour course complicated by mild shoulder dystocia. pt had PP uterine atony requiring DC cytotec.     S:    The patient has no complaints.  Pain controlled with current treatment regimen.   She is ambulating without difficulty and tolerating PO.   + flatus/-BM/+ voiding   She endorses appropriate lochia, which is decreasing.   Pt is planning on formula feeding    O:    Vital Signs Last 24 Hrs  T(C): 36.8 (2023 05:00), Max: 36.8 (2023 05:00)  T(F): 98.2 (2023 05:00), Max: 98.2 (2023 05:00)  HR: 77 (2023 05:00) (74 - 80)  BP: 100/61 (2023 05:00) (99/54 - 102/66)  BP(mean): --  RR: 16 (2023 05:00) (16 - 18)  SpO2: 99% (2023 05:00) (98% - 99%)    Parameters below as of 2023 05:00  Patient On (Oxygen Delivery Method): room air        Gen: NAD, AOx3  CV: RRR  Pulm: CTAB  Breast: Nontender, non-engorged   Abdomen:  Soft, non-tender, non-distended, +bowel sounds  Uterus:  Fundus firm below umbilicus  VE:  +Lochia  Ext:  Non-tender and non-edematous                          10.5   x     )-----------( x        ( 2023 08:09 )             31.7               
EDGAR WEBBER is a 27y  now PPD1 s/p spontaneous vaginal delivery at 38 weeks 6 days gestation. Labour course complicated by mild shoulder dystocia. pt had PP uterine atony requiring ND cytotec.     S:    The patient has no complaints.  Pain controlled with current treatment regimen.   She is ambulating without difficulty and tolerating PO.   + flatus/-BM/+ voiding   She endorses appropriate lochia, which is decreasing.   Pt is planning on formula feeding    O:    T(C): 36.8 (23 @ 04:03), Max: 36.9 (23 @ 12:12)  HR: 72 (23 @ 04:03) (70 - 88)  BP: 110/62 (23 @ 04:03) (99/59 - 110/65)  RR: 18 (23 @ 04:03) (18 - 18)  SpO2: 100% (23 @ 04:03) (99% - 100%)    Gen: NAD, AOx3  CV: RRR  Pulm: CTAB  Breast: Nontender, non-engorged   Abdomen:  Soft, non-tender, non-distended, +bowel sounds  Uterus:  Fundus firm below umbilicus  VE:  +Lochia  Ext:  Non-tender and non-edematous                          10.5   x     )-----------( x        ( 2023 08:09 )             31.7

## 2023-06-13 LAB — SURGICAL PATHOLOGY STUDY: SIGNIFICANT CHANGE UP

## 2023-06-14 DIAGNOSIS — Z88.0 ALLERGY STATUS TO PENICILLIN: ICD-10-CM

## 2023-06-14 DIAGNOSIS — Z3A.38 38 WEEKS GESTATION OF PREGNANCY: ICD-10-CM

## 2023-06-22 ENCOUNTER — APPOINTMENT (OUTPATIENT)
Dept: OBGYN | Facility: CLINIC | Age: 28
End: 2023-06-22
Payer: MEDICAID

## 2023-06-22 VITALS — SYSTOLIC BLOOD PRESSURE: 104 MMHG | DIASTOLIC BLOOD PRESSURE: 64 MMHG | RESPIRATION RATE: 16 BRPM | HEIGHT: 63 IN

## 2023-06-22 PROCEDURE — 99214 OFFICE O/P EST MOD 30 MIN: CPT

## 2023-06-22 NOTE — HISTORY OF PRESENT ILLNESS
[FreeTextEntry1] : 28yo s/p  on 23 of 9lb 5oz baby. \par Doing well, formula feeding, no complaints.\par Mild spotting\par Desires OCP contraception.\par Rayland score 9\par \par Gyn: perineum healing well\par \par Plan:\par Discussed all contraception options in detail. Pt comfortable with OCPs and has taken them in the past.\par OCP rx'd today, no contraindications, no intercourse yet, will wait until after her postpatum visit at 6wks\par 30 min spent\par TERRY Herndon MD

## 2023-07-20 ENCOUNTER — APPOINTMENT (OUTPATIENT)
Dept: OBGYN | Facility: CLINIC | Age: 28
End: 2023-07-20
Payer: MEDICAID

## 2023-07-20 VITALS
HEIGHT: 63 IN | WEIGHT: 194 LBS | RESPIRATION RATE: 16 BRPM | DIASTOLIC BLOOD PRESSURE: 62 MMHG | BODY MASS INDEX: 34.38 KG/M2 | SYSTOLIC BLOOD PRESSURE: 104 MMHG

## 2023-07-20 PROCEDURE — 0503F POSTPARTUM CARE VISIT: CPT

## 2023-07-20 RX ORDER — NORETHINDRONE ACETATE AND ETHINYL ESTRADIOL AND FERROUS FUMARATE 1MG-20(21)
1-20 KIT ORAL
Qty: 3 | Refills: 3 | Status: ACTIVE | COMMUNITY
Start: 2020-07-02 | End: 1900-01-01

## 2023-07-20 NOTE — HISTORY OF PRESENT ILLNESS
[FreeTextEntry1] : 28yo s/p  on  of 9lb 5oz baby.\par Doing well, formula feeding, no issues.\par Good support at home, mother lives wit them.\par Gyn: cervix closed, perineum well healed. \par Pt will fill OCPs for contraception.\par Pap/hr hpv sent today\par TERRY Herndon MD

## 2023-08-15 LAB
CYTOLOGY CVX/VAG DOC THIN PREP: NORMAL
HPV HIGH+LOW RISK DNA PNL CVX: NOT DETECTED

## 2024-02-08 RX ORDER — METHYLPREDNISOLONE 4 MG/1
4 TABLET ORAL
Qty: 1 | Refills: 0 | Status: DISCONTINUED | COMMUNITY
Start: 2021-10-01 | End: 2024-02-08

## 2024-02-08 RX ORDER — AZITHROMYCIN 250 MG/1
250 TABLET, FILM COATED ORAL
Qty: 1 | Refills: 0 | Status: DISCONTINUED | COMMUNITY
Start: 2021-10-01 | End: 2024-02-08

## 2024-02-09 ENCOUNTER — APPOINTMENT (OUTPATIENT)
Dept: FAMILY MEDICINE | Facility: CLINIC | Age: 29
End: 2024-02-09
Payer: MEDICAID

## 2024-02-09 ENCOUNTER — NON-APPOINTMENT (OUTPATIENT)
Age: 29
End: 2024-02-09

## 2024-02-09 VITALS
TEMPERATURE: 98.2 F | HEIGHT: 63 IN | HEART RATE: 114 BPM | BODY MASS INDEX: 34.91 KG/M2 | WEIGHT: 197 LBS | OXYGEN SATURATION: 98 % | DIASTOLIC BLOOD PRESSURE: 72 MMHG | SYSTOLIC BLOOD PRESSURE: 106 MMHG

## 2024-02-09 DIAGNOSIS — Z00.00 ENCOUNTER FOR GENERAL ADULT MEDICAL EXAMINATION W/OUT ABNORMAL FINDINGS: ICD-10-CM

## 2024-02-09 DIAGNOSIS — R11.2 NAUSEA WITH VOMITING, UNSPECIFIED: ICD-10-CM

## 2024-02-09 DIAGNOSIS — R11.0 NAUSEA: ICD-10-CM

## 2024-02-09 PROCEDURE — 36415 COLL VENOUS BLD VENIPUNCTURE: CPT

## 2024-02-09 PROCEDURE — 99385 PREV VISIT NEW AGE 18-39: CPT

## 2024-02-09 RX ORDER — ONDANSETRON 4 MG/1
4 TABLET, ORALLY DISINTEGRATING ORAL EVERY 8 HOURS
Qty: 30 | Refills: 1 | Status: ACTIVE | COMMUNITY
Start: 2024-02-09 | End: 1900-01-01

## 2024-02-09 NOTE — HEALTH RISK ASSESSMENT
[Yes] : Yes [Monthly or less (1 pt)] : Monthly or less (1 point) [3 or 4 (1 pt)] : 3 or 4  (1 point) [Never (0 pts)] : Never (0 points) [0] : 2) Feeling down, depressed, or hopeless: Not at all (0) [Patient reported PAP Smear was normal] : Patient reported PAP Smear was normal [Never] : Never [Audit-CScore] : 2 [PapSmearDate] : 00/2023

## 2024-02-09 NOTE — PLAN
[FreeTextEntry1] : 28-year-old female for new patient physical exam.  Routine labs ordered today.  Pap per GYN.  Gastroenteritis.  Diet modifications suggested.  Adequate hydration advised.  Vertigo.  Supportive therapy with meclizine as needed discussed.  Signs & symptoms warranting further eval advised.  All questions answered.  Patient voiced understanding and in agreement to plan.  Return to clinic as recommended.

## 2024-02-09 NOTE — REVIEW OF SYSTEMS
[Fever] : no fever [Discharge] : no discharge [Earache] : no earache [Sore Throat] : no sore throat [Chest Pain] : no chest pain [Palpitations] : no palpitations [Shortness Of Breath] : no shortness of breath [Cough] : no cough [Abdominal Pain] : no abdominal pain [Constipation] : no constipation [Diarrhea] : diarrhea [Dysuria] : no dysuria [Headache] : no headache

## 2024-02-09 NOTE — HISTORY OF PRESENT ILLNESS
[FreeTextEntry1] : nppe [de-identified] : 27 yo F for NPPE.   Vomiting and diarrhea x1 day. Took Imodium.  1-week hx of vertigo type symptoms.

## 2024-02-12 LAB
ALBUMIN SERPL ELPH-MCNC: 4.9 G/DL
ALP BLD-CCNC: 78 U/L
ALT SERPL-CCNC: 25 U/L
ANION GAP SERPL CALC-SCNC: 15 MMOL/L
AST SERPL-CCNC: 30 U/L
BASOPHILS # BLD AUTO: 0.04 K/UL
BASOPHILS NFR BLD AUTO: 0.6 %
BILIRUB SERPL-MCNC: 0.7 MG/DL
BUN SERPL-MCNC: 13 MG/DL
CALCIUM SERPL-MCNC: 9.6 MG/DL
CHLORIDE SERPL-SCNC: 102 MMOL/L
CHOLEST SERPL-MCNC: 185 MG/DL
CO2 SERPL-SCNC: 22 MMOL/L
CREAT SERPL-MCNC: 0.63 MG/DL
EGFR: 125 ML/MIN/1.73M2
EOSINOPHIL # BLD AUTO: 0.04 K/UL
EOSINOPHIL NFR BLD AUTO: 0.6 %
ESTIMATED AVERAGE GLUCOSE: 105 MG/DL
GLUCOSE SERPL-MCNC: 108 MG/DL
HBA1C MFR BLD HPLC: 5.3 %
HCG SERPL-MCNC: <1 MIU/ML
HCT VFR BLD CALC: 43.7 %
HDLC SERPL-MCNC: 55 MG/DL
HGB BLD-MCNC: 14.6 G/DL
IMM GRANULOCYTES NFR BLD AUTO: 0.5 %
LDLC SERPL CALC-MCNC: 116 MG/DL
LYMPHOCYTES # BLD AUTO: 1.18 K/UL
LYMPHOCYTES NFR BLD AUTO: 18.7 %
MAN DIFF?: NORMAL
MCHC RBC-ENTMCNC: 30.8 PG
MCHC RBC-ENTMCNC: 33.4 GM/DL
MCV RBC AUTO: 92.2 FL
MONOCYTES # BLD AUTO: 0.88 K/UL
MONOCYTES NFR BLD AUTO: 13.9 %
NEUTROPHILS # BLD AUTO: 4.14 K/UL
NEUTROPHILS NFR BLD AUTO: 65.7 %
NONHDLC SERPL-MCNC: 131 MG/DL
PLATELET # BLD AUTO: 291 K/UL
POTASSIUM SERPL-SCNC: 4 MMOL/L
PROT SERPL-MCNC: 8.2 G/DL
RBC # BLD: 4.74 M/UL
RBC # FLD: 13.3 %
SODIUM SERPL-SCNC: 139 MMOL/L
TRIGL SERPL-MCNC: 79 MG/DL
TSH SERPL-ACNC: 0.9 UIU/ML
WBC # FLD AUTO: 6.31 K/UL

## 2024-03-11 ENCOUNTER — NON-APPOINTMENT (OUTPATIENT)
Age: 29
End: 2024-03-11

## 2024-03-22 NOTE — ED PROVIDER NOTE - PMH
[FreeTextEntry1] : Pt has ear abscess and ear mass that  requires imaging.  <<----- Click to add NO pertinent Past Medical History No pertinent past medical history

## 2024-03-27 ENCOUNTER — NON-APPOINTMENT (OUTPATIENT)
Age: 29
End: 2024-03-27

## 2025-03-10 NOTE — ED ADULT NURSE NOTE - RN DISCHARGE SIGNATURE
Migraine Headache  A migraine headache is an intense, throbbing pain on one side or both sides of the head. Migraine headaches may also cause other symptoms, such as nausea, vomiting, and sensitivity to light and noise. A migraine headache can last from 4 hours to 3 days. Talk with your doctor about what things may bring on (trigger) your migraine headaches.  What are the causes?  The exact cause of this condition is not known. However, a migraine may be caused when nerves in the brain become irritated and release chemicals that cause inflammation of blood vessels. This inflammation causes pain. This condition may be triggered or caused by:  Drinking alcohol.  Smoking.  Taking medicines, such as:  Medicine used to treat chest pain (nitroglycerin).  Birth control pills.  Estrogen.  Certain blood pressure medicines.  Eating or drinking products that contain nitrates, glutamate, aspartame, or tyramine. Aged cheeses, chocolate, or caffeine may also be triggers.  Doing physical activity.  Other things that may trigger a migraine headache include:  Menstruation.  Pregnancy.  Hunger.  Stress.  Lack of sleep or too much sleep.  Weather changes.  Fatigue.  What increases the risk?  The following factors may make you more likely to experience migraine headaches:  Being a certain age. This condition is more common in people who are 25-55 years old.  Being female.  Having a family history of migraine headaches.  Being .  Having a mental health condition, such as depression or anxiety.  Being obese.  What are the signs or symptoms?  The main symptom of this condition is pulsating or throbbing pain. This pain may:  Happen in any area of the head, such as on one side or both sides.  Interfere with daily activities.  Get worse with physical activity.  Get worse with exposure to bright lights or loud noises.  Other symptoms may include:  Nausea.  Vomiting.  Dizziness.  General sensitivity to bright lights, loud noises, or  smells.  Before you get a migraine headache, you may get warning signs (an aura). An aura may include:  Seeing flashing lights or having blind spots.  Seeing bright spots, halos, or zigzag lines.  Having tunnel vision or blurred vision.  Having numbness or a tingling feeling.  Having trouble talking.  Having muscle weakness.  Some people have symptoms after a migraine headache (postdromal phase), such as:  Feeling tired.  Difficulty concentrating.  How is this diagnosed?  A migraine headache can be diagnosed based on:  Your symptoms.  A physical exam.  Tests, such as:  CT scan or an MRI of the head. These imaging tests can help rule out other causes of headaches.  Taking fluid from the spine (lumbar puncture) and analyzing it (cerebrospinal fluid analysis, or CSF analysis).  How is this treated?  This condition may be treated with medicines that:  Relieve pain.  Relieve nausea.  Prevent migraine headaches.  Treatment for this condition may also include:  Acupuncture.  Lifestyle changes like avoiding foods that trigger migraine headaches.  Biofeedback.  Cognitive behavioral therapy.  Follow these instructions at home:  Medicines  Take over-the-counter and prescription medicines only as told by your health care provider.  Ask your health care provider if the medicine prescribed to you:  Requires you to avoid driving or using heavy machinery.  Can cause constipation. You may need to take these actions to prevent or treat constipation:  Drink enough fluid to keep your urine pale yellow.  Take over-the-counter or prescription medicines.  Eat foods that are high in fiber, such as beans, whole grains, and fresh fruits and vegetables.  Limit foods that are high in fat and processed sugars, such as fried or sweet foods.  Lifestyle  Do not drink alcohol.  Do not use any products that contain nicotine or tobacco, such as cigarettes, e-cigarettes, and chewing tobacco. If you need help quitting, ask your health care  provider.  Get at least 8 hours of sleep every night.  Find ways to manage stress, such as meditation, deep breathing, or yoga.  General instructions               Keep a journal to find out what may trigger your migraine headaches. For example, write down:  What you eat and drink.  How much sleep you get.  Any change to your diet or medicines.  If you have a migraine headache:  Avoid things that make your symptoms worse, such as bright lights.  It may help to lie down in a dark, quiet room.  Do not drive or use heavy machinery.  Ask your health care provider what activities are safe for you while you are experiencing symptoms.  Keep all follow-up visits as told by your health care provider. This is important.  Contact a health care provider if:  You develop symptoms that are different or more severe than your usual migraine headache symptoms.  You have more than 15 headache days in one month.  Get help right away if:  Your migraine headache becomes severe.  Your migraine headache lasts longer than 72 hours.  You have a fever.  You have a stiff neck.  You have vision loss.  Your muscles feel weak or like you cannot control them.  You start to lose your balance often.  You have trouble walking.  You faint.  You have a seizure.  Summary  A migraine headache is an intense, throbbing pain on one side or both sides of the head. Migraines may also cause other symptoms, such as nausea, vomiting, and sensitivity to light and noise.  This condition may be treated with medicines and lifestyle changes. You may also need to avoid certain things that trigger a migraine headache.  Keep a journal to find out what may trigger your migraine headaches.  Contact your health care provider if you have more than 15 headache days in a month or you develop symptoms that are different or more severe than your usual migraine headache symptoms.  This information is not intended to replace advice given to you by your health care provider. Make  sure you discuss any questions you have with your health care provider.  Document Revised: 04/10/2020 Document Reviewed: 01/30/2020  [employer]vier Patient Education © 2020 [employer]       21-Jun-2017

## 2025-04-08 DIAGNOSIS — Z34.93 ENCOUNTER FOR SUPERVISION OF NORMAL PREGNANCY, UNSPECIFIED, THIRD TRIMESTER: ICD-10-CM

## 2025-04-08 DIAGNOSIS — Z34.90 ENCOUNTER FOR SUPERVISION OF NORMAL PREGNANCY, UNSPECIFIED, UNSPECIFIED TRIMESTER: ICD-10-CM

## 2025-04-08 DIAGNOSIS — O35.10X0 MATERNAL CARE FOR (SUSPECTED) CHROMOSOMAL ABNORMALITY IN FETUS, UNSPECIFIED, NOT APPLICABLE OR UNSPECIFIED: ICD-10-CM

## 2025-04-08 DIAGNOSIS — Z34.92 ENCOUNTER FOR SUPERVISION OF NORMAL PREGNANCY, UNSPECIFIED, SECOND TRIMESTER: ICD-10-CM

## 2025-08-07 ENCOUNTER — NON-APPOINTMENT (OUTPATIENT)
Age: 30
End: 2025-08-07